# Patient Record
Sex: FEMALE | Race: WHITE | NOT HISPANIC OR LATINO | Employment: OTHER | URBAN - METROPOLITAN AREA
[De-identification: names, ages, dates, MRNs, and addresses within clinical notes are randomized per-mention and may not be internally consistent; named-entity substitution may affect disease eponyms.]

---

## 2023-10-05 DIAGNOSIS — D50.0 IRON DEFICIENCY ANEMIA DUE TO CHRONIC BLOOD LOSS: Primary | ICD-10-CM

## 2023-10-05 DIAGNOSIS — Z13.220 LIPID SCREENING: ICD-10-CM

## 2023-10-09 ENCOUNTER — APPOINTMENT (OUTPATIENT)
Dept: LAB | Facility: CLINIC | Age: 83
End: 2023-10-09
Payer: COMMERCIAL

## 2023-10-09 DIAGNOSIS — Z13.220 LIPID SCREENING: ICD-10-CM

## 2023-10-09 DIAGNOSIS — D50.0 IRON DEFICIENCY ANEMIA DUE TO CHRONIC BLOOD LOSS: ICD-10-CM

## 2023-10-09 LAB
ALBUMIN SERPL BCP-MCNC: 4.1 G/DL (ref 3.5–5)
ALP SERPL-CCNC: 69 U/L (ref 34–104)
ALT SERPL W P-5'-P-CCNC: 13 U/L (ref 7–52)
ANION GAP SERPL CALCULATED.3IONS-SCNC: 6 MMOL/L
AST SERPL W P-5'-P-CCNC: 19 U/L (ref 13–39)
BASOPHILS # BLD AUTO: 0.08 THOUSANDS/ÂΜL (ref 0–0.1)
BASOPHILS NFR BLD AUTO: 1 % (ref 0–1)
BILIRUB SERPL-MCNC: 0.37 MG/DL (ref 0.2–1)
BUN SERPL-MCNC: 21 MG/DL (ref 5–25)
CALCIUM SERPL-MCNC: 9.7 MG/DL (ref 8.4–10.2)
CHLORIDE SERPL-SCNC: 102 MMOL/L (ref 96–108)
CHOLEST SERPL-MCNC: 193 MG/DL
CO2 SERPL-SCNC: 29 MMOL/L (ref 21–32)
CREAT SERPL-MCNC: 0.91 MG/DL (ref 0.6–1.3)
EOSINOPHIL # BLD AUTO: 0.08 THOUSAND/ÂΜL (ref 0–0.61)
EOSINOPHIL NFR BLD AUTO: 1 % (ref 0–6)
ERYTHROCYTE [DISTWIDTH] IN BLOOD BY AUTOMATED COUNT: 15.2 % (ref 11.6–15.1)
FERRITIN SERPL-MCNC: 5 NG/ML (ref 11–307)
GFR SERPL CREATININE-BSD FRML MDRD: 59 ML/MIN/1.73SQ M
GLUCOSE SERPL-MCNC: 101 MG/DL (ref 65–140)
HCT VFR BLD AUTO: 43.2 % (ref 34.8–46.1)
HDLC SERPL-MCNC: 67 MG/DL
HGB BLD-MCNC: 12.8 G/DL (ref 11.5–15.4)
IMM GRANULOCYTES # BLD AUTO: 0.01 THOUSAND/UL (ref 0–0.2)
IMM GRANULOCYTES NFR BLD AUTO: 0 % (ref 0–2)
IRON SATN MFR SERPL: 8 % (ref 15–50)
IRON SERPL-MCNC: 32 UG/DL (ref 50–212)
LDLC SERPL CALC-MCNC: 107 MG/DL (ref 0–100)
LYMPHOCYTES # BLD AUTO: 1.41 THOUSANDS/ÂΜL (ref 0.6–4.47)
LYMPHOCYTES NFR BLD AUTO: 25 % (ref 14–44)
MCH RBC QN AUTO: 24.5 PG (ref 26.8–34.3)
MCHC RBC AUTO-ENTMCNC: 29.6 G/DL (ref 31.4–37.4)
MCV RBC AUTO: 83 FL (ref 82–98)
MONOCYTES # BLD AUTO: 0.63 THOUSAND/ÂΜL (ref 0.17–1.22)
MONOCYTES NFR BLD AUTO: 11 % (ref 4–12)
NEUTROPHILS # BLD AUTO: 3.38 THOUSANDS/ÂΜL (ref 1.85–7.62)
NEUTS SEG NFR BLD AUTO: 62 % (ref 43–75)
NONHDLC SERPL-MCNC: 126 MG/DL
NRBC BLD AUTO-RTO: 0 /100 WBCS
PLATELET # BLD AUTO: 224 THOUSANDS/UL (ref 149–390)
PMV BLD AUTO: 10.4 FL (ref 8.9–12.7)
POTASSIUM SERPL-SCNC: 4.6 MMOL/L (ref 3.5–5.3)
PROT SERPL-MCNC: 6.6 G/DL (ref 6.4–8.4)
RBC # BLD AUTO: 5.23 MILLION/UL (ref 3.81–5.12)
SODIUM SERPL-SCNC: 137 MMOL/L (ref 135–147)
TIBC SERPL-MCNC: 418 UG/DL (ref 250–450)
TRIGL SERPL-MCNC: 95 MG/DL
TSH SERPL DL<=0.05 MIU/L-ACNC: 3.26 UIU/ML (ref 0.45–4.5)
UIBC SERPL-MCNC: 386 UG/DL (ref 155–355)
WBC # BLD AUTO: 5.59 THOUSAND/UL (ref 4.31–10.16)

## 2023-10-09 PROCEDURE — 82728 ASSAY OF FERRITIN: CPT

## 2023-10-09 PROCEDURE — 83540 ASSAY OF IRON: CPT

## 2023-10-09 PROCEDURE — 85025 COMPLETE CBC W/AUTO DIFF WBC: CPT

## 2023-10-09 PROCEDURE — 36415 COLL VENOUS BLD VENIPUNCTURE: CPT

## 2023-10-09 PROCEDURE — 80061 LIPID PANEL: CPT

## 2023-10-09 PROCEDURE — 84443 ASSAY THYROID STIM HORMONE: CPT

## 2023-10-09 PROCEDURE — 80053 COMPREHEN METABOLIC PANEL: CPT

## 2023-10-09 PROCEDURE — 83550 IRON BINDING TEST: CPT

## 2023-10-17 DIAGNOSIS — N30.00 ACUTE CYSTITIS WITHOUT HEMATURIA: Primary | ICD-10-CM

## 2023-10-17 RX ORDER — NITROFURANTOIN 25; 75 MG/1; MG/1
100 CAPSULE ORAL 2 TIMES DAILY
Qty: 10 CAPSULE | Refills: 0 | Status: SHIPPED | OUTPATIENT
Start: 2023-10-17 | End: 2023-10-22

## 2023-11-15 ENCOUNTER — OFFICE VISIT (OUTPATIENT)
Dept: FAMILY MEDICINE CLINIC | Facility: CLINIC | Age: 83
End: 2023-11-15
Payer: COMMERCIAL

## 2023-11-15 ENCOUNTER — TELEPHONE (OUTPATIENT)
Dept: ADMINISTRATIVE | Facility: OTHER | Age: 83
End: 2023-11-15

## 2023-11-15 VITALS
HEART RATE: 82 BPM | DIASTOLIC BLOOD PRESSURE: 80 MMHG | SYSTOLIC BLOOD PRESSURE: 142 MMHG | OXYGEN SATURATION: 98 % | HEIGHT: 64 IN

## 2023-11-15 DIAGNOSIS — Z00.00 MEDICARE ANNUAL WELLNESS VISIT, SUBSEQUENT: Primary | ICD-10-CM

## 2023-11-15 DIAGNOSIS — R06.09 DOE (DYSPNEA ON EXERTION): ICD-10-CM

## 2023-11-15 DIAGNOSIS — R31.0 GROSS HEMATURIA: ICD-10-CM

## 2023-11-15 PROCEDURE — G0439 PPPS, SUBSEQ VISIT: HCPCS | Performed by: FAMILY MEDICINE

## 2023-11-15 RX ORDER — NITROFURANTOIN 25; 75 MG/1; MG/1
100 CAPSULE ORAL 2 TIMES DAILY
Qty: 10 CAPSULE | Refills: 0 | Status: SHIPPED | OUTPATIENT
Start: 2023-11-15 | End: 2023-11-20

## 2023-11-15 RX ORDER — FUROSEMIDE 20 MG/1
20 TABLET ORAL 2 TIMES DAILY
COMMUNITY
End: 2023-11-15

## 2023-11-15 RX ORDER — HYDROCHLOROTHIAZIDE 25 MG/1
25 TABLET ORAL DAILY
Qty: 30 TABLET | Refills: 3 | Status: SHIPPED | OUTPATIENT
Start: 2023-11-15

## 2023-11-15 RX ORDER — UBIDECARENONE 50 MG
200 CAPSULE ORAL DAILY
COMMUNITY

## 2023-11-15 RX ORDER — TURMERIC 100 %
500 POWDER (GRAM) MISCELLANEOUS 2 TIMES DAILY
COMMUNITY

## 2023-11-15 NOTE — TELEPHONE ENCOUNTER
----- Message from Alexis Watkins sent at 11/15/2023  7:14 AM EST -----  Regarding: Care Gap request  11/15/23 7:14 AM    Hello, our patient attached above has had CRC: Colonoscopy completed/performed. Please assist in updating the patient chart by pulling the Care Everywhere (CE) document. The date of service is 4/2022.      Thank you,  Kathy DAMON [facility]

## 2023-11-15 NOTE — PROGRESS NOTES
Assessment/Plan:       1. Medicare annual wellness visit, subsequent  Assessment & Plan:  Reviewed age-appropriate health maintenance and preventive care. 2. Gross hematuria  Comments:  check u/s  can't have a CT scan  Orders:  -     US kidney and bladder; Future; Expected date: 11/15/2023  -     nitrofurantoin (MACROBID) 100 mg capsule; Take 1 capsule (100 mg total) by mouth 2 (two) times a day for 5 days  -     UA w Reflex to Microscopic w Reflex to Culture -Lab Collect; Future; Expected date: 11/15/2023    3. MACKEY (dyspnea on exertion)  Comments:  check bnp  start hctz  Orders:  -     B-Type Natriuretic Peptide(BNP); Future  -     hydrochlorothiazide (HYDRODIURIL) 25 mg tablet; Take 1 tablet (25 mg total) by mouth daily  -     Echo complete w/ contrast if indicated; Future; Expected date: 11/15/2023          Subjective:      Patient ID: Warren Malagon is a 82 y.o. female. Patient is here for an annual Medicare she is stable but having a few issues. She has chronic iron deficiency anemia with blood loss which is well-known she recently has had some urinary tract infection symptoms of blood in her urine    Check a urinalysis and an ultrasound of kidney and bladder. She cannot tolerate contrast dye. She also has some dyspnea on exertion possibly related to weight or deconditioning but we will check an echo and a BNP. She is not a smoker. She drinks rarely she understands importance of healthy diet and exercise. The following portions of the patient's history were reviewed and updated as appropriate: allergies, current medications, past family history, past medical history, past social history, past surgical history, and problem list.    Review of Systems   Respiratory: Negative. Cardiovascular: Negative. Gastrointestinal: Negative. Depression Screening and Follow-up Plan: Patient was screened for depression during today's encounter. They screened negative with a PHQ-2 score of 1. Objective:      /80 (BP Location: Left arm, Patient Position: Sitting, Cuff Size: Standard)   Pulse 82   Ht 5' 4" (1.626 m)   SpO2 98%          Physical Exam  Vitals and nursing note reviewed. Constitutional:       Appearance: Normal appearance. HENT:      Head: Normocephalic and atraumatic. Nose: Nose normal.      Mouth/Throat:      Mouth: Mucous membranes are moist.   Eyes:      Extraocular Movements: Extraocular movements intact. Pupils: Pupils are equal, round, and reactive to light. Cardiovascular:      Rate and Rhythm: Normal rate and regular rhythm. Pulses: Normal pulses. Pulmonary:      Effort: Pulmonary effort is normal.      Breath sounds: Normal breath sounds. Abdominal:      General: Bowel sounds are normal.      Palpations: Abdomen is soft. Musculoskeletal:      Cervical back: Normal range of motion. Skin:     General: Skin is warm and dry. Capillary Refill: Capillary refill takes less than 2 seconds. Neurological:      General: No focal deficit present. Mental Status: She is alert.    Psychiatric:         Mood and Affect: Mood normal.

## 2023-11-15 NOTE — TELEPHONE ENCOUNTER
----- Message from Alexis Dunaway sent at 11/15/2023  7:13 AM EST -----  Regarding: Care Gap request  11/15/23 7:14 AM    Hello, our patient attached above has had Medicare AWV completed/performed. Please assist in updating the patient chart by pulling the Care Everywhere (CE) document. The date of service is [id number]  .      Thank you,  Taras DAMON [facility]

## 2023-11-16 NOTE — TELEPHONE ENCOUNTER
Medicare AWV: Upon review of the In Basket request we have found/obtained the documentation. After careful review of the document we are unable to complete this request for Medicare AWV because the documentation does not have the proper verbiage (wording) needed to close the requested care gap(s). Colonoscopy: Upon review of the In Basket request we have found that the patient has aged out of the measure and/or the document date is outside of the measure timeframe. Any additional questions or concerns should be emailed to the Practice Liaisons via the appropriate education email address, please do not reply via In Basket.     Thank you  Radha Russ

## 2023-11-30 ENCOUNTER — LAB (OUTPATIENT)
Dept: LAB | Facility: CLINIC | Age: 83
End: 2023-11-30
Payer: COMMERCIAL

## 2023-11-30 DIAGNOSIS — R06.09 DOE (DYSPNEA ON EXERTION): ICD-10-CM

## 2023-11-30 DIAGNOSIS — R31.0 GROSS HEMATURIA: ICD-10-CM

## 2023-11-30 LAB — BNP SERPL-MCNC: 108 PG/ML (ref 0–100)

## 2023-11-30 PROCEDURE — 36415 COLL VENOUS BLD VENIPUNCTURE: CPT

## 2023-11-30 PROCEDURE — 83880 ASSAY OF NATRIURETIC PEPTIDE: CPT

## 2023-12-01 ENCOUNTER — APPOINTMENT (OUTPATIENT)
Dept: LAB | Facility: CLINIC | Age: 83
End: 2023-12-01
Payer: COMMERCIAL

## 2023-12-01 LAB
BILIRUB UR QL STRIP: NEGATIVE
CLARITY UR: CLEAR
COLOR UR: NORMAL
GLUCOSE UR STRIP-MCNC: NEGATIVE MG/DL
HGB UR QL STRIP.AUTO: NEGATIVE
KETONES UR STRIP-MCNC: NEGATIVE MG/DL
LEUKOCYTE ESTERASE UR QL STRIP: NEGATIVE
NITRITE UR QL STRIP: NEGATIVE
PH UR STRIP.AUTO: 6.5 [PH]
PROT UR STRIP-MCNC: NEGATIVE MG/DL
SP GR UR STRIP.AUTO: 1.01 (ref 1–1.03)
UROBILINOGEN UR STRIP-ACNC: <2 MG/DL

## 2023-12-01 PROCEDURE — 81003 URINALYSIS AUTO W/O SCOPE: CPT

## 2023-12-19 ENCOUNTER — HOSPITAL ENCOUNTER (OUTPATIENT)
Dept: ULTRASOUND IMAGING | Facility: HOSPITAL | Age: 83
Discharge: HOME | End: 2023-12-19
Payer: COMMERCIAL

## 2023-12-19 ENCOUNTER — HOSPITAL ENCOUNTER (OUTPATIENT)
Dept: NON INVASIVE DIAGNOSTICS | Facility: HOSPITAL | Age: 83
Discharge: HOME | End: 2023-12-19
Payer: COMMERCIAL

## 2023-12-19 VITALS — HEIGHT: 64 IN | SYSTOLIC BLOOD PRESSURE: 142 MMHG | DIASTOLIC BLOOD PRESSURE: 80 MMHG | HEART RATE: 82 BPM

## 2023-12-19 DIAGNOSIS — R31.0 GROSS HEMATURIA: ICD-10-CM

## 2023-12-19 DIAGNOSIS — R06.09 DOE (DYSPNEA ON EXERTION): ICD-10-CM

## 2023-12-19 LAB
AORTIC ROOT: 3.1 CM
AORTIC VALVE MEAN VELOCITY: 11.1 M/S
APICAL FOUR CHAMBER EJECTION FRACTION: 58 %
ASCENDING AORTA: 3.6 CM
AV LVOT MEAN GRADIENT: 4 MMHG
AV LVOT PEAK GRADIENT: 9 MMHG
AV MEAN GRADIENT: 6 MMHG
AV PEAK GRADIENT: 13 MMHG
AV VELOCITY RATIO: 0.81
DOP CALC AO PEAK VEL: 1.8 M/S
DOP CALC AO VTI: 36.75 CM
DOP CALC LVOT PEAK VEL VTI: 33.08 CM
DOP CALC LVOT PEAK VEL: 1.46 M/S
E WAVE DECELERATION TIME: 222 MS
E/A RATIO: 0.86
FRACTIONAL SHORTENING: 46 (ref 28–44)
INTERVENTRICULAR SEPTUM IN DIASTOLE (PARASTERNAL SHORT AXIS VIEW): 0.8 CM
INTERVENTRICULAR SEPTUM: 0.8 CM (ref 0.6–1.1)
LAAS-AP2: 22.6 CM2
LAAS-AP4: 26.9 CM2
LEFT ATRIUM SIZE: 3.5 CM
LEFT ATRIUM VOLUME (MOD BIPLANE): 87 ML
LEFT INTERNAL DIMENSION IN SYSTOLE: 2.7 CM (ref 2.1–4)
LEFT VENTRICULAR INTERNAL DIMENSION IN DIASTOLE: 5 CM (ref 3.5–6)
LEFT VENTRICULAR POSTERIOR WALL IN END DIASTOLE: 0.8 CM
LEFT VENTRICULAR STROKE VOLUME: 93 ML
LVSV (TEICH): 93 ML
MV E'TISSUE VEL-SEP: 9 CM/S
MV PEAK A VEL: 1.1 M/S
MV PEAK E VEL: 95 CM/S
MV STENOSIS PRESSURE HALF TIME: 64 MS
MV VALVE AREA P 1/2 METHOD: 3.44
RIGHT VENTRICLE ID DIMENSION: 4.2 CM
SL CV LEFT ATRIUM LENGTH A2C: 5.5 CM
SL CV LV EF: 55
SL CV PED ECHO LEFT VENTRICLE DIASTOLIC VOLUME (MOD BIPLANE) 2D: 119 ML
SL CV PED ECHO LEFT VENTRICLE SYSTOLIC VOLUME (MOD BIPLANE) 2D: 27 ML
TR MAX PG: 37 MMHG
TR PEAK VELOCITY: 3.1 M/S
TRICUSPID ANNULAR PLANE SYSTOLIC EXCURSION: 2.5 CM
TRICUSPID VALVE PEAK REGURGITATION VELOCITY: 3.05 M/S

## 2023-12-19 PROCEDURE — 76775 US EXAM ABDO BACK WALL LIM: CPT

## 2023-12-19 PROCEDURE — 93306 TTE W/DOPPLER COMPLETE: CPT | Performed by: INTERNAL MEDICINE

## 2023-12-19 PROCEDURE — 93306 TTE W/DOPPLER COMPLETE: CPT

## 2024-01-14 PROBLEM — Z00.00 MEDICARE ANNUAL WELLNESS VISIT, SUBSEQUENT: Status: RESOLVED | Noted: 2023-11-15 | Resolved: 2024-01-14

## 2024-04-04 DIAGNOSIS — K04.7 TOOTH ABSCESS: Primary | ICD-10-CM

## 2024-04-04 RX ORDER — PENICILLIN V POTASSIUM 500 MG/1
500 TABLET ORAL EVERY 8 HOURS SCHEDULED
Qty: 30 TABLET | Refills: 0 | Status: SHIPPED | OUTPATIENT
Start: 2024-04-04 | End: 2024-04-14

## 2024-04-12 DIAGNOSIS — R06.09 DOE (DYSPNEA ON EXERTION): ICD-10-CM

## 2024-04-12 DIAGNOSIS — R29.898 LEG WEAKNESS, BILATERAL: Primary | ICD-10-CM

## 2024-04-12 RX ORDER — HYDROCHLOROTHIAZIDE 25 MG/1
25 TABLET ORAL DAILY
Qty: 30 TABLET | Refills: 3 | Status: SHIPPED | OUTPATIENT
Start: 2024-04-12

## 2024-04-22 ENCOUNTER — DOCUMENTATION (OUTPATIENT)
Dept: FAMILY MEDICINE CLINIC | Facility: CLINIC | Age: 84
End: 2024-04-22

## 2024-04-22 DIAGNOSIS — K04.7 TOOTH ABSCESS: Primary | ICD-10-CM

## 2024-04-22 RX ORDER — PENICILLIN V POTASSIUM 500 MG/1
500 TABLET ORAL EVERY 8 HOURS SCHEDULED
Qty: 30 TABLET | Refills: 0 | Status: SHIPPED | OUTPATIENT
Start: 2024-04-22 | End: 2024-05-02

## 2024-04-29 DIAGNOSIS — D50.9 IRON DEFICIENCY ANEMIA, UNSPECIFIED IRON DEFICIENCY ANEMIA TYPE: Primary | ICD-10-CM

## 2024-04-30 ENCOUNTER — APPOINTMENT (OUTPATIENT)
Dept: LAB | Facility: CLINIC | Age: 84
End: 2024-04-30
Payer: COMMERCIAL

## 2024-04-30 DIAGNOSIS — D50.9 IRON DEFICIENCY ANEMIA, UNSPECIFIED IRON DEFICIENCY ANEMIA TYPE: ICD-10-CM

## 2024-04-30 LAB
CRP SERPL QL: 2.3 MG/L
ERYTHROCYTE [DISTWIDTH] IN BLOOD BY AUTOMATED COUNT: 16.9 % (ref 11.6–15.1)
EST. AVERAGE GLUCOSE BLD GHB EST-MCNC: 120 MG/DL
FERRITIN SERPL-MCNC: 11 NG/ML (ref 11–307)
HBA1C MFR BLD: 5.8 %
HCT VFR BLD AUTO: 45.7 % (ref 34.8–46.1)
HGB BLD-MCNC: 14 G/DL (ref 11.5–15.4)
MCH RBC QN AUTO: 26.3 PG (ref 26.8–34.3)
MCHC RBC AUTO-ENTMCNC: 30.6 G/DL (ref 31.4–37.4)
MCV RBC AUTO: 86 FL (ref 82–98)
PLATELET # BLD AUTO: 213 THOUSANDS/UL (ref 149–390)
PMV BLD AUTO: 9.8 FL (ref 8.9–12.7)
RBC # BLD AUTO: 5.32 MILLION/UL (ref 3.81–5.12)
WBC # BLD AUTO: 6.35 THOUSAND/UL (ref 4.31–10.16)

## 2024-04-30 PROCEDURE — 83036 HEMOGLOBIN GLYCOSYLATED A1C: CPT

## 2024-04-30 PROCEDURE — 85027 COMPLETE CBC AUTOMATED: CPT

## 2024-04-30 PROCEDURE — 82728 ASSAY OF FERRITIN: CPT

## 2024-04-30 PROCEDURE — 80053 COMPREHEN METABOLIC PANEL: CPT

## 2024-04-30 PROCEDURE — 86140 C-REACTIVE PROTEIN: CPT

## 2024-04-30 PROCEDURE — 36415 COLL VENOUS BLD VENIPUNCTURE: CPT

## 2024-05-08 LAB
ALBUMIN SERPL BCP-MCNC: 4.2 G/DL (ref 3.5–5)
ALP SERPL-CCNC: 56 U/L (ref 34–104)
ALT SERPL W P-5'-P-CCNC: 16 U/L (ref 7–52)
ANION GAP SERPL CALCULATED.3IONS-SCNC: 7 MMOL/L (ref 4–13)
AST SERPL W P-5'-P-CCNC: 22 U/L (ref 13–39)
BILIRUB SERPL-MCNC: 0.43 MG/DL (ref 0.2–1)
BUN SERPL-MCNC: 18 MG/DL (ref 5–25)
CALCIUM SERPL-MCNC: 9.6 MG/DL (ref 8.4–10.2)
CHLORIDE SERPL-SCNC: 101 MMOL/L (ref 96–108)
CO2 SERPL-SCNC: 28 MMOL/L (ref 21–32)
CREAT SERPL-MCNC: 0.85 MG/DL (ref 0.6–1.3)
GFR SERPL CREATININE-BSD FRML MDRD: 64 ML/MIN/1.73SQ M
GLUCOSE P FAST SERPL-MCNC: 104 MG/DL (ref 65–99)
POTASSIUM SERPL-SCNC: 4.3 MMOL/L (ref 3.5–5.3)
PROT SERPL-MCNC: 6.5 G/DL (ref 6.4–8.4)
SODIUM SERPL-SCNC: 136 MMOL/L (ref 135–147)

## 2024-05-14 NOTE — PROGRESS NOTES
PT Evaluation     Today's date: 5/15/2024  Patient name: Hazel Rodney  : 1940  MRN: [id number]  Referring provider: Budinetz, Robert, MD  Dx:   Encounter Diagnosis     ICD-10-CM    1. Leg weakness, bilateral  R29.898 Ambulatory Referral to Physical Therapy          Start Time: 1430  Stop Time: 1515  Total time in clinic (min): 45 minutes    Assessment  Impairments: abnormal or restricted ROM, activity intolerance, impaired physical strength and lacks appropriate home exercise program    Assessment details: Hazel Rodney is a 83 y.o. female presenting to outpatient physical therapy with chief complaint of B leg weakness and B knee pain. Pt reporting difficulty walking, standing, and stair climbing. Per examination, noted impairments including B knee pain, impaired BLE soft tissue extensibility, significantly reduced B knee range of motion, reduced BLE strength, reduced postural awareness, impaired gait and reduced activity tolerance. Pt demo significantly impaired B knee ext>flx. Pt demonstrating impaired knee ext in stance as well. Ambulating with shortened stride length and wide MARYSOL. RLE tending to ER. Demo difficulty performing STS and functional squat. No change in symptoms with lumbar AROM. RLE strength slightly more impaired than LLE. Also demo impaired static/dynamic balance. Due to noted impairments, the patient's present functional limitations include difficulty with ADLs/IADLs, difficulty walking, stair climbing, and standing, reliance on medication and/or modalities for pain relief, reduced tolerance for functional mobility/activity, and difficulty completing HH responsibilities. Patient to benefit from skilled outpatient physical therapy 2x/week for 6-8 weeks in order to reduce pain, maximize pain free LE range of motion, increase LE strength and stability, improve balance, and improve functional mobility/functional activity in order to maximize return to prior level of function with reduced  "limitations. Home exercise program was provided and all questions answered to patient's level of satisfaction. Thank you for your referral.       Understanding of Dx/Px/POC: good     Prognosis: fair    Goals  STGs to be achieved in 4 weeks:  1. Pt to demonstrate reduced subjective pain rating \"at worst\" by at least 2-3 points from Initial Eval in order to allow for reduced pain with ADLs and improved functional activity tolerance.   2. Pt to demonstrate increased AROM of B knee flexion by at least 5-10 degrees in order to allow for greater ease and independence with ADLs and functional mobility.   3. Pt to demonstrate increased AROM of B knee extension by at least 5-10 degrees in order to maximize joint mobility and function and allow for progression of exercise program and achievement of goals.   4. Pt to demonstrate increased MMT of impaired B hip and knee movements by at least 1/2-1 grade in order to improve safety and stability with ADLs and functional mobility.   5. Pt to report at least 25% improvement with performance of ADLs/IADLs to demo improving function.     LTGs to be achieved by discharge:  1. Pt will be I with HEP in order to continue to improve quality of life and independence and reduce risk for re-injury.   2. Pt to demonstrate   3. Pt to demonstrate improved function as noted by achieving or exceeding predicted score on FOTO outcomes assessment tool.   4. Pt will demonstrate 4+-5/5 MMT of B knee strength to demonstrate improved overall LE function.     5. Pt to demonstrate ability to walk for at least 20 minutes without rest break           Plan  Patient would benefit from: skilled physical therapy  Planned modality interventions: cryotherapy and thermotherapy: hydrocollator packs    Planned therapy interventions: abdominal trunk stabilization, IASTM, joint mobilization, manual therapy, massage, neuromuscular re-education, patient education, strengthening, stretching, therapeutic activities, " therapeutic exercise, home exercise program, flexibility, coordination, body mechanics training and balance    Frequency: 2x week  Duration in weeks: 8  Plan of Care beginning date: 5/15/2024  Plan of Care expiration date: 2024  Treatment plan discussed with: patient      Subjective Evaluation    History of Present Illness  Mechanism of injury: Pt is presenting to OPPT with chief complaint of  B leg weakness. Pt reports she feels she can only stand and walk for a few minutes due to weakness and pain. Reports that she used to walk miles and she can not do that anymore. Reports that weakness started a few years ago; at least 10 years. Reports that weakness has been progressing. Reports that she has B knee pain as well and feels her ankles feel unstable. Reports that her gait is off and so that causes some pain as well. Reports she uses walking poles to steady herself. Reports that she also feels her endurance is down as well. Reports that she is also SOB when walking/standing. Reports that she has 17 steps to get into bathroom and reports that she has significant difficulty ascending/descending. Denies any numbness/tingling into BLE. Reports that she has arthritis in both knees. Reports her balance is also off and she feels unsteady. Reports that her R foot also pronates.     Reports she is currently taking diuretic. Reports 2 previous knee arthroscopies.           Recurrent probem    Quality of life: good    Patient Goals  Patient goals for therapy: decreased pain, improved balance, increased motion, increased strength, independence with ADLs/IADLs and return to sport/leisure activities  Patient goal: increase strength of my legs, get back to walking a couple hundred yards  Pain  Current pain ratin  At best pain ratin  At worst pain ratin  Location: B knees, medial aspect of R knee, entire aspect of L knee  Quality: sharp and dull ache  Relieving factors: rest, relaxation and medications  Aggravating  factors: walking, standing and lifting    Social Support  Stairs in house: yes   Lives in: multiple-level home    Employment status: not working    Diagnostic Tests  No diagnostic tests performed  Treatments  Previous treatment: physical therapy  Current treatment: physical therapy      Objective     Concurrent Complaints  Positive for bladder dysfunction. Negative for night pain, disturbed sleep, bowel dysfunction and saddle (S4) numbness    Neurological Testing     Sensation     Lumbar   Left   Intact: light touch    Right   Intact: light touch    Active Range of Motion     Lumbar   Flexion:  Restriction level: moderate  Extension:  Restriction level: moderate  Left lateral flexion:  Restriction level: moderate  Right lateral flexion:  Restriction level: moderate  Left rotation:  Restriction level: moderate  Right rotation:  Restriction level: moderate  Left Knee   Flexion: 120 degrees with pain  Extension: -20 degrees with pain    Right Knee   Flexion: 108 degrees with pain  Extension: -22 degrees with pain    Strength/Myotome Testing     Left Hip   Planes of Motion   Flexion: 4  Extension: 4  Abduction: 4  Adduction: 4+  External rotation: 4  Internal rotation: 4    Isolated Muscles   Gluteus charito: 4-  Gluteus medius: 4-    Right Hip   Planes of Motion   Flexion: 4-  Extension: 4-  Abduction: 4-  Adduction: 4+  External rotation: 4  Internal rotation: 4    Isolated Muscles   Gluteus maximums: 4-  Gluteus medius: 4-    Left Knee   Flexion: 4-  Extension: 4-    Right Knee   Flexion: 4  Extension: 4    Left Ankle/Foot   Dorsiflexion: 4+  Plantar flexion: 4+  Inversion: 4+  Eversion: 4+    Right Ankle/Foot   Dorsiflexion: 4  Plantar flexion: 4+  Inversion: 4+  Eversion: 4+    Tests     Lumbar     Left   Negative crossed SLR and passive SLR.     Right   Negative crossed SLR and passive SLR.     Ambulation     Observational Gait   Gait: antalgic   Decreased walking speed and stride length.            Precautions:  hx of GI bleed, low iron       Re-eval Date:  by 6/14/24    Date 5/15/2024        Visit Count 1       FOTO 5/15/2024        Pain In See IE       Pain Out See IE           Manuals 5/15/2024                                        Neuro Re-Ed        Tandem stance         Tandem walking         FTEO on foam         Hurdles                                 Ther Ex        Nustep        Seated marches  reviewed       LAQ reviewed       Seated hip abd        Seated hip add        SLR reviewed       Leg ext/flx machine        Leg press         squats        Step ups        Side steps         Hamstring stretch         Calf stretch         Heel slides                 Ther Activity        STS                Gait Training                        Modalities                              5/15/2024  - HEP was issued and reviewed this date for above noted exercises. Pt demonstrated understanding without incident and without questions/concerns. Will continue to update upcoming.

## 2024-05-15 ENCOUNTER — EVALUATION (OUTPATIENT)
Dept: PHYSICAL THERAPY | Facility: CLINIC | Age: 84
End: 2024-05-15
Payer: COMMERCIAL

## 2024-05-15 DIAGNOSIS — R29.898 LEG WEAKNESS, BILATERAL: ICD-10-CM

## 2024-05-15 PROCEDURE — 97110 THERAPEUTIC EXERCISES: CPT

## 2024-05-15 PROCEDURE — 97161 PT EVAL LOW COMPLEX 20 MIN: CPT

## 2024-05-20 ENCOUNTER — OFFICE VISIT (OUTPATIENT)
Dept: PHYSICAL THERAPY | Facility: CLINIC | Age: 84
End: 2024-05-20
Payer: COMMERCIAL

## 2024-05-20 DIAGNOSIS — R29.898 LEG WEAKNESS, BILATERAL: Primary | ICD-10-CM

## 2024-05-20 PROCEDURE — 97110 THERAPEUTIC EXERCISES: CPT

## 2024-05-20 NOTE — PROGRESS NOTES
"Daily Note     Today's date: 2024  Patient name: Hazel Rodney  : 1940  MRN: [id number]  Referring provider: Budinetz, Robert, MD  Dx:   Encounter Diagnosis     ICD-10-CM    1. Leg weakness, bilateral  R29.898           Start Time: 1600  Stop Time: 1645  Total time in clinic (min): 45 minutes    Subjective: \"My knees hurt.  I didn't do any exercises over the weekend. \"      Objective: See treatment diary below      Assessment: Tolerated treatment fair. Initiated exercise program this date.  Decreased endurance and standing tolerance.  Alternated between seated vs standing with good tolerance.  Decreased pain in knees noted at end of session.  Will continue to monitor and progress as able.  Patient demonstrated fatigue post treatment and would benefit from continued PT      Plan: Continue per plan of care.  Progress treatment as tolerated.       Precautions: hx of GI bleed, low iron       Re-eval Date:  by 24    Date 5/15/2024  5/20      Visit Count 1 2      FOTO 5/15/2024        Pain In See IE 7/10      Pain Out See IE 5-610          Manuals 5/15/2024  5/20                                      Neuro Re-Ed        Tandem stance         Tandem walking         FTEO on foam         Hurdles                                 Ther Ex        Nustep  L1 10'      Seated marches  reviewed       LAQ reviewed 2x10/3-5\"      Seated hip abd  Grn   2x10/3-5\"      Seated hip add  Ball 20x/3-5\"      SLR reviewed Standing flex/abd  2x10/3-5\"      Leg ext/flx machine        Leg press         squats        Step ups        Side steps         Hamstring stretch         Calf stretch         Heel slides                 Ther Activity        STS                Gait Training                        Modalities                              5/15/2024  - HEP was issued and reviewed this date for above noted exercises. Pt demonstrated understanding without incident and without questions/concerns. Will continue to update upcoming.          "

## 2024-05-24 DIAGNOSIS — N30.00 ACUTE CYSTITIS WITHOUT HEMATURIA: Primary | ICD-10-CM

## 2024-05-24 RX ORDER — NITROFURANTOIN 25; 75 MG/1; MG/1
100 CAPSULE ORAL 2 TIMES DAILY
Qty: 10 CAPSULE | Refills: 0 | Status: SHIPPED | OUTPATIENT
Start: 2024-05-24 | End: 2024-05-29

## 2024-05-30 ENCOUNTER — OFFICE VISIT (OUTPATIENT)
Dept: PHYSICAL THERAPY | Facility: CLINIC | Age: 84
End: 2024-05-30
Payer: COMMERCIAL

## 2024-05-30 DIAGNOSIS — R29.898 LEG WEAKNESS, BILATERAL: Primary | ICD-10-CM

## 2024-05-30 PROCEDURE — 97110 THERAPEUTIC EXERCISES: CPT

## 2024-05-30 NOTE — PROGRESS NOTES
"Daily Note     Today's date: 2024  Patient name: Hazel Rodney  : 1940  MRN: [id number]  Referring provider: Budinetz, Robert, MD  Dx:   Encounter Diagnosis     ICD-10-CM    1. Leg weakness, bilateral  R29.898           Start Time: 1115  Stop Time: 1200  Total time in clinic (min): 45 minutes    Subjective: \"My legs are feeling weak today and my R ankle is bothering me\"       Objective: See treatment diary below      Assessment: Tolerated treatment well. Able to complete POC without incident. Tolerated progression of LE strengthening machines well today with appropriate fatigue noted. Cont with impaired B knee extension when ambulating and tends to walk in crouched position. No increase in pain/sx at end of session. Overall endurance remains impaired as well; pt fatigued from ambulating into clinic. Will cont to progress as tolerated. Patient demonstrated fatigue post treatment and would benefit from continued PT      Plan: Continue per plan of care.  Progress treatment as tolerated.       Precautions: hx of GI bleed, low iron       Re-eval Date:  by 24    Date 5/15/2024  5/20 5/30     Visit Count 1 2 3     FOTO 5/15/2024        Pain In See IE 7/10 6/10     Pain Out See IE -6/10 5/10          Manuals 5/15/2024  5/20 5/30                                     Neuro Re-Ed        Tandem stance         Tandem walking         FTEO on foam         Hurdles                                 Ther Ex        Nustep  L1 10' L2 10'      Seated marches  reviewed       LAQ reviewed 2x10/3-5\" 2x10/3-5\"      Seated hip abd  Grn   2x10/3-5\" Grn   2x10/3-5\"     Seated hip add  Ball 20x/3-5\" Ball 30x/3-5\"     SLR reviewed Standing flex/abd  2x10/3-5\" Standing flex/abd  2x10/3-5\"     Leg ext/flx machine   Flx 11# 2x10     Ext 11# 2x10     Leg press    50# 2x10      squats        Step ups        Side steps         Hamstring stretch         Calf stretch         Heel slides                 Ther Activity        STS              "   Gait Training                        Modalities                              5/15/2024  - HEP was issued and reviewed this date for above noted exercises. Pt demonstrated understanding without incident and without questions/concerns. Will continue to update upcoming.

## 2024-05-31 DIAGNOSIS — I10 BENIGN ESSENTIAL HTN: Primary | ICD-10-CM

## 2024-05-31 RX ORDER — LISINOPRIL 10 MG/1
10 TABLET ORAL DAILY
Qty: 90 TABLET | Refills: 3 | Status: SHIPPED | OUTPATIENT
Start: 2024-05-31

## 2024-06-03 ENCOUNTER — OFFICE VISIT (OUTPATIENT)
Dept: PHYSICAL THERAPY | Facility: CLINIC | Age: 84
End: 2024-06-03
Payer: COMMERCIAL

## 2024-06-03 DIAGNOSIS — R29.898 LEG WEAKNESS, BILATERAL: Primary | ICD-10-CM

## 2024-06-03 PROCEDURE — 97110 THERAPEUTIC EXERCISES: CPT

## 2024-06-03 NOTE — PROGRESS NOTES
"Daily Note     Today's date: 6/3/2024  Patient name: Hazel Rodney  : 1940  MRN: [id number]  Referring provider: Budinetz, Robert, MD  Dx:   Encounter Diagnosis     ICD-10-CM    1. Leg weakness, bilateral  R29.898           Start Time: 1115  Stop Time: 1200  Total time in clinic (min): 45 minutes    Subjective: \"My R knee hurts this morning and I think it might be because I had gluten. I felt good after alst session but was a little sore but I felt really good yesterday\"       Objective: See treatment diary below      Assessment: Tolerated treatment well. Able to complete POC without incident. Pt continuing to walk with flexed gait pattern due to decreased knee ext ROM. Noted instability with ambulation throughout clinic. Pt was able to increase reps this session with there ex which pt tolerated well; pt did experience increased LE fatigue. Pt requiring seated rest breaks throughout session due to overall fatigue and decreased endurance. Standing exercises limited due to B knee pain. Will cont to progress as tolerated.  Patient demonstrated fatigue post treatment and would benefit from continued PT      Plan: Continue per plan of care.  Progress treatment as tolerated.       Precautions: hx of GI bleed, low iron       Re-eval Date:  by 24    Date 5/15/2024  5/20 5/30 6/3    Visit Count 1 2 3 4    FOTO 5/15/2024        Pain In See IE 7/10 6/10 5/10 B knee     Pain Out See IE -6/10 5/10  5/10         Manuals 5/15/2024  5/20 5/30 6/3                                    Neuro Re-Ed        Tandem stance         Tandem walking         FTEO on foam         Hurdles                                 Ther Ex        Nustep  L1 10' L2 10'  L3 10'     Seated marches  reviewed   x30    LAQ reviewed 2x10/3-5\" 2x10/3-5\"      Seated hip abd  Grn   2x10/3-5\" Grn   2x10/3-5\" blue   2x10/3-5\"    Seated hip add  Ball 20x/3-5\" Ball 30x/3-5\" Ball 30x/3-5\"     SLR reviewed Standing flex/abd  2x10/3-5\" Standing flex/abd  2x10/3-5\" " "Standing flex/abd  2x10/3-5\"    Leg ext/flx machine   Flx 11# 2x10     Ext 11# 2x10 Flx 11# 2x10     Ext 11# 2x10    Leg press    50# 2x10  60# 3x10     squats        Step ups        Side steps         Hamstring stretch         Calf stretch         Heel slides                 Ther Activity        STS                Gait Training                        Modalities                              5/15/2024  - HEP was issued and reviewed this date for above noted exercises. Pt demonstrated understanding without incident and without questions/concerns. Will continue to update upcoming.                "

## 2024-06-11 ENCOUNTER — EVALUATION (OUTPATIENT)
Dept: PHYSICAL THERAPY | Facility: CLINIC | Age: 84
End: 2024-06-11
Payer: COMMERCIAL

## 2024-06-11 DIAGNOSIS — R29.898 LEG WEAKNESS, BILATERAL: Primary | ICD-10-CM

## 2024-06-11 PROCEDURE — 97110 THERAPEUTIC EXERCISES: CPT

## 2024-06-11 NOTE — PROGRESS NOTES
"PT Re-Evaluation     Today's date: 2024  Patient name: Hazel Rodney  : 1940  MRN: [id number]  Referring provider: Budinetz, Robert, MD  Dx:   Encounter Diagnosis     ICD-10-CM    1. Leg weakness, bilateral  R29.898             Start Time: 1115  Stop Time: 1210  Total time in clinic (min): 55 minutes    Assessment  Impairments: abnormal or restricted ROM, activity intolerance, impaired physical strength and lacks appropriate home exercise program    Assessment details: Hazel Rodney has been consistent and compliant with attending PT sessions. Noted improvements in BLE strength and B knee ROM since SOC, though continuing with impairments. Pt still lacking full knee extension which is impacting ambulation. Pt has been able to tolerate increased standing exercises, but continues to be challenged with standing exercises due to overall decreased muscular/CV endurance. Still continuing with some noted difficulty performing STS due to leg weakness, but able to complete with quicker performance and less UE support. Continues with noted BLE soft tissue restrictions, though improving steadily. Pt educated how progressing sessions to 2x/wk may help with improvement in noted impairments since pt has reported inability to perform HEP consistently. Pt in agreement. Also, plan to add in more balance activities now that pt can tolerate standing for longer periods. Due to noted impairments, pt will continue to benefit from skilled PT services 2x/wk for another 4 weeks to address continued impairments that continue to impact overall function, performance of ADLs/IADLs, and mobility. Exam findings were discussed with pt and all questions answered to pt satisfaction.         Understanding of Dx/Px/POC: good     Prognosis: fair    Goals  STGs to be achieved in 4 weeks:  1. Pt to demonstrate reduced subjective pain rating \"at worst\" by at least 2-3 points from Initial Eval in order to allow for reduced pain with ADLs and improved " functional activity tolerance. PROGRESSING  2. Pt to demonstrate increased AROM of B knee flexion by at least 5-10 degrees in order to allow for greater ease and independence with ADLs and functional mobility. PARTIALLY MET  3. Pt to demonstrate increased AROM of B knee extension by at least 5-10 degrees in order to maximize joint mobility and function and allow for progression of exercise program and achievement of goals. PARTIALLY MET  4. Pt to demonstrate increased MMT of impaired B hip and knee movements by at least 1/2-1 grade in order to improve safety and stability with ADLs and functional mobility. MET  5. Pt to report at least 25% improvement with performance of ADLs/IADLs to demo improving function. PROGRESSING    LTGs to be achieved by discharge:  1. Pt will be I with HEP in order to continue to improve quality of life and independence and reduce risk for re-injury. PROGRESSING  2. Pt to demonstrate improved function as noted by achieving or exceeding predicted score on FOTO outcomes assessment tool.   3. Pt will demonstrate 4+-5/5 MMT of B knee strength to demonstrate improved overall LE function.   PROGRESSING  4. Pt to demonstrate ability to walk for at least 20 minutes without rest break PROGRESSING          Plan  Patient would benefit from: skilled physical therapy  Planned modality interventions: cryotherapy and thermotherapy: hydrocollator packs    Planned therapy interventions: abdominal trunk stabilization, IASTM, joint mobilization, manual therapy, massage, neuromuscular re-education, patient education, strengthening, stretching, therapeutic activities, therapeutic exercise, home exercise program, flexibility, coordination, body mechanics training and balance    Frequency: 2x week  Duration in weeks: 4  Plan of Care beginning date: 6/11/2024  Plan of Care expiration date: 7/23/2024  Treatment plan discussed with: patient      Subjective Evaluation    History of Present Illness  Mechanism of  injury: Pt is presenting to OPPT with chief complaint of  B leg weakness. Pt reports she feels she can only stand and walk for a few minutes due to weakness and pain. Reports that she used to walk miles and she can not do that anymore. Reports that weakness started a few years ago; at least 10 years. Reports that weakness has been progressing. Reports that she has B knee pain as well and feels her ankles feel unstable. Reports that her gait is off and so that causes some pain as well. Reports she uses walking poles to steady herself. Reports that she also feels her endurance is down as well. Reports that she is also SOB when walking/standing. Reports that she has 17 steps to get into bathroom and reports that she has significant difficulty ascending/descending. Denies any numbness/tingling into BLE. Reports that she has arthritis in both knees. Reports her balance is also off and she feels unsteady. Reports that her R foot also pronates.     Reports she is currently taking diuretic. Reports 2 previous knee arthroscopies.     6/11/24: Pt reports she feels her strength in BLE is improving steadily. Reports that she feels she can get around slightly easier than before. Reports she is continuing with pain in B knees that does limit performance of activities. Reports she has not walked very far distances yet due to impaired endurance. Reports she still has some difficulty standing up from chair but can now perform quicker. Reports she has been performing HEP as well. Reports R leg still feels a little weaker than the R.           Recurrent probem    Quality of life: good    Patient Goals  Patient goals for therapy: decreased pain, improved balance, increased motion, increased strength, independence with ADLs/IADLs and return to sport/leisure activities  Patient goal: increase strength of my legs PROGRESSING, get back to walking a couple hundred yards PROGRESSING  Pain  Current pain rating: 3  At best pain rating: 3  At  worst pain ratin  Location: B knees, medial aspect of R knee, entire aspect of L knee  Quality: sharp and dull ache  Relieving factors: rest, relaxation and medications  Aggravating factors: walking, standing and lifting    Social Support  Stairs in house: yes   Lives in: multiple-level home    Employment status: not working    Diagnostic Tests  No diagnostic tests performed  Treatments  Previous treatment: physical therapy  Current treatment: physical therapy      Objective     Concurrent Complaints  Positive for bladder dysfunction. Negative for night pain, disturbed sleep, bowel dysfunction and saddle (S4) numbness    Neurological Testing     Sensation     Lumbar   Left   Intact: light touch    Right   Intact: light touch    Active Range of Motion     Lumbar   Flexion:  Restriction level: moderate  Extension:  Restriction level: moderate  Left lateral flexion:  Restriction level: minimal  Right lateral flexion:  Restriction level: minimal  Left rotation:  Restriction level: moderate  Right rotation:  Restriction level: moderate  Left Knee   Flexion: 120 degrees with pain  Extension: -16 degrees with pain    Right Knee   Flexion: 110 degrees with pain  Extension: -19 degrees with pain    Strength/Myotome Testing     Left Hip   Planes of Motion   Flexion: 4  Extension: 4  Abduction: 4  Adduction: 4+  External rotation: 4  Internal rotation: 4+    Isolated Muscles   Gluteus charito: 4  Gluteus medius: 4    Right Hip   Planes of Motion   Flexion: 4  Extension: 4  Abduction: 4  Adduction: 4+  External rotation: 4  Internal rotation: 4+    Isolated Muscles   Gluteus maximums: 4  Gluteus medius: 4    Left Knee   Flexion: 4  Extension: 4+    Right Knee   Flexion: 4  Extension: 4+    Left Ankle/Foot   Dorsiflexion: 4+  Plantar flexion: 4+  Inversion: 4+  Eversion: 4+    Right Ankle/Foot   Dorsiflexion: 4+  Plantar flexion: 4+  Inversion: 4+  Eversion: 4+    Ambulation     Observational Gait   Gait: antalgic  "  Decreased walking speed and stride length.            Precautions: hx of GI bleed, low iron     Re-eval Date:  by 6/14/24     Date 5/15/2024  5/20 5/30 6/3  6/11   Visit Count 1 2 3 4  5   FOTO 5/15/2024            Pain In See IE 7/10 6/10 5/10 B knee   3/10    Pain Out See IE 5-6/10 5/10  5/10   3/10          Manuals 5/15/2024  5/20 5/30 6/3  6/11                                                           Neuro Re-Ed             Tandem stance              Tandem walking              FTEO on foam              Hurdles                                                        Ther Ex             Nustep   L1 10' L2 10'  L3 10'   L5 12'    Seated marches  reviewed     x30  standing marches x30   LAQ reviewed 2x10/3-5\" 2x10/3-5\"        Seated hip abd   Grn   2x10/3-5\" Grn   2x10/3-5\" blue   2x10/3-5\"  blue   2x10/3-5\"   Seated hip add   Ball 20x/3-5\" Ball 30x/3-5\" Ball 30x/3-5\"   Ball 30x/3-5\"    SLR reviewed Standing flex/abd  2x10/3-5\" Standing flex/abd  2x10/3-5\" Standing flex/abd  2x10/3-5\"  Standing flex/abd  2x10/3-5\"   Leg ext/flx machine     Flx 11# 2x10      Ext 11# 2x10 Flx 11# 2x10      Ext 11# 2x10  Flx 22# 2x10      Ext 22# 2x10   Leg press      50# 2x10  60# 3x10   65# 3x10    squats             Step ups             Side steps              Hamstring stretch              Calf stretch           calf stretch with strap 3x30\"    Heel slides                            Ther Activity             STS                           Gait Training                                         Modalities                                                       "

## 2024-06-18 ENCOUNTER — OFFICE VISIT (OUTPATIENT)
Dept: PHYSICAL THERAPY | Facility: CLINIC | Age: 84
End: 2024-06-18
Payer: COMMERCIAL

## 2024-06-18 DIAGNOSIS — R29.898 LEG WEAKNESS, BILATERAL: Primary | ICD-10-CM

## 2024-06-18 PROCEDURE — 97110 THERAPEUTIC EXERCISES: CPT

## 2024-06-18 NOTE — PROGRESS NOTES
"Daily Note     Today's date: 2024  Patient name: Hazel Rodney  : 1940  MRN: [id number]  Referring provider: Budinetz, Robert, MD  Dx:   Encounter Diagnosis     ICD-10-CM    1. Leg weakness, bilateral  R29.898                      Subjective: Pt notes her R knee hurts more than her L, but her R knee is stronger than her Left.  Notes clicking R knee on NS. Pt feels it may be due to excess fluid. Pt states her BP was 154/101 this morning.      Objective: See treatment diary below      Assessment: Tolerated treatment well. Patient demonstrated fatigue post treatment, exhibited good technique with therapeutic exercises, and would benefit from continued PT  Ambulating with knees flexed. Pt completed all ex, req'd seated rest after standing ex. Pt finds standing ex challenging.    Plan: Continue per plan of care.      Precautions: hx of GI bleed, low iron     Re-eval Date:  by 24     Date 6/18 5/20 5/30 6/3  6/11   Visit Count 6 2 3 4  5   FOTO 5/15/2024            Pain In 5/10 7/10 6/10 5/10 B knee   3/10    Pain Out  5-6/10 5/10  5/10   3/10          Manuals 6/18 5/20 5/30 6/3  6/11                                                           Neuro Re-Ed             Tandem stance              Tandem walking              FTEO on foam              Hurdles                                                        Ther Ex             Nustep  L5  10' L1 10' L2 10'  L3 10'   L5 12'    Seated marches   Standing 30x     x30  standing marches x30   LAQ 50x  B 2x10/3-5\" 2x10/3-5\"        Seated hip abd  blue 30x Grn   2x10/3-5\" Grn   2x10/3-5\" blue   2x10/3-5\"  blue   2x10/3-5\"   Seated hip add  ball  30x Ball 20x/3-5\" Ball 30x/3-5\" Ball 30x/3-5\"   Ball 30x/3-5\"    SLR    Standing flex/abd  3x10/3-5 Standing flex/abd  2x10/3-5\" Standing flex/abd  2x10/3-5\" Standing flex/abd  2x10/3-5\"  Standing flex/abd  2x10/3-5\"   Leg ext/flx machine   Flx 22# 3x10      Ext 22# 3x10   Flx 11# 2x10      Ext 11# 2x10 Flx 11# 2x10      Ext " "11# 2x10  Flx 22# 2x10      Ext 22# 2x10   Leg press   70#  30x   50# 2x10  60# 3x10   65# 3x10    squats             Step ups             Side steps              Hamstring stretch              Calf stretch           calf stretch with strap 3x30\"    Heel slides                            Ther Activity             STS                           Gait Training                                         Modalities                                                       "

## 2024-06-20 ENCOUNTER — OFFICE VISIT (OUTPATIENT)
Dept: PHYSICAL THERAPY | Facility: CLINIC | Age: 84
End: 2024-06-20
Payer: COMMERCIAL

## 2024-06-20 DIAGNOSIS — R29.898 LEG WEAKNESS, BILATERAL: Primary | ICD-10-CM

## 2024-06-20 PROCEDURE — 97110 THERAPEUTIC EXERCISES: CPT

## 2024-06-20 NOTE — PROGRESS NOTES
"Daily Note     Today's date: 2024  Patient name: Hazel Rodney  : 1940  MRN: [id number]  Referring provider: Budinetz, Robert, MD  Dx:   Encounter Diagnosis     ICD-10-CM    1. Leg weakness, bilateral  R29.898           Start Time: 1300  Stop Time: 1345  Total time in clinic (min): 45 minutes    Subjective: \"I am tired and sore.  This humid is not good for my body.\"      Objective: See treatment diary below      Assessment: Tolerated treatment well. Able to complete program without incident.  Quick muscle fatigue with standing exercises.  Will attempt to increase weight next session if no exacerbation occurs.  Will continue to monitor and progress as able.  Patient demonstrated fatigue post treatment and would benefit from continued PT      Plan: Continue per plan of care.  Progress treatment as tolerated.       Precautions: hx of GI bleed, low iron     Re-eval Date:  by 24     Date       Visit Count 6 7      FOTO 5/15/2024   closed      Pain In 5/10       Pain Out              Manuals                                                               Neuro Re-Ed             Tandem stance              Tandem walking              FTEO on foam              Hurdles                                                        Ther Ex             Nustep  L5  10' L4 10'      Seated marches   Standing 30x  Standing 30x       LAQ          Seated hip abd  blue 30x Blue  3x10      Seated hip add  ball  30x Ball 30x/3-5\"      SLR    Standing flex/abd  3x10/3-5 Standing flex/abd  2-3x10/3-5\"      Leg ext/flx machine   Flx 22# 3x10      Ext 22# 3x10  Flx 22# 2x10      Ext 22# 2x10      Leg press   70#  30x  70#  30x      squats             Step ups             Side steps              Hamstring stretch              Calf stretch            Heel slides                            Ther Activity             STS                           Gait Training                                         Modalities               "

## 2024-06-25 ENCOUNTER — APPOINTMENT (OUTPATIENT)
Dept: PHYSICAL THERAPY | Facility: CLINIC | Age: 84
End: 2024-06-25
Payer: COMMERCIAL

## 2024-06-27 ENCOUNTER — APPOINTMENT (OUTPATIENT)
Dept: PHYSICAL THERAPY | Facility: CLINIC | Age: 84
End: 2024-06-27
Payer: COMMERCIAL

## 2024-07-04 DIAGNOSIS — Z00.6 ENCOUNTER FOR EXAMINATION FOR NORMAL COMPARISON OR CONTROL IN CLINICAL RESEARCH PROGRAM: ICD-10-CM

## 2024-08-05 ENCOUNTER — TELEPHONE (OUTPATIENT)
Age: 84
End: 2024-08-05

## 2024-08-05 NOTE — TELEPHONE ENCOUNTER
"Patient called to advise that Wednesday is Dr Pink's birthday. She said to let him know on Wednesday that she advised office to get him \"Baconator fries from [employer]\"  "

## 2024-08-20 ENCOUNTER — DOCUMENTATION (OUTPATIENT)
Dept: FAMILY MEDICINE CLINIC | Facility: CLINIC | Age: 84
End: 2024-08-20

## 2024-08-20 DIAGNOSIS — N30.00 ACUTE CYSTITIS WITHOUT HEMATURIA: Primary | ICD-10-CM

## 2024-08-20 RX ORDER — NITROFURANTOIN 25; 75 MG/1; MG/1
100 CAPSULE ORAL 2 TIMES DAILY
Qty: 10 CAPSULE | Refills: 0 | Status: SHIPPED | OUTPATIENT
Start: 2024-08-20 | End: 2024-08-25

## 2024-09-05 DIAGNOSIS — R73.01 IMPAIRED FASTING GLUCOSE: Primary | ICD-10-CM

## 2024-09-05 DIAGNOSIS — R53.83 OTHER FATIGUE: ICD-10-CM

## 2024-09-05 DIAGNOSIS — D50.9 IRON DEFICIENCY ANEMIA, UNSPECIFIED IRON DEFICIENCY ANEMIA TYPE: ICD-10-CM

## 2024-09-06 ENCOUNTER — OFFICE VISIT (OUTPATIENT)
Dept: LAB | Facility: CLINIC | Age: 84
End: 2024-09-06
Payer: COMMERCIAL

## 2024-09-06 DIAGNOSIS — Z00.6 ENCOUNTER FOR EXAMINATION FOR NORMAL COMPARISON OR CONTROL IN CLINICAL RESEARCH PROGRAM: ICD-10-CM

## 2024-09-06 DIAGNOSIS — D50.9 IRON DEFICIENCY ANEMIA, UNSPECIFIED IRON DEFICIENCY ANEMIA TYPE: ICD-10-CM

## 2024-09-06 DIAGNOSIS — R53.83 OTHER FATIGUE: ICD-10-CM

## 2024-09-06 DIAGNOSIS — R73.01 IMPAIRED FASTING GLUCOSE: ICD-10-CM

## 2024-09-06 LAB
ALBUMIN SERPL BCG-MCNC: 3.8 G/DL (ref 3.5–5)
ALP SERPL-CCNC: 70 U/L (ref 34–104)
ALT SERPL W P-5'-P-CCNC: 34 U/L (ref 7–52)
ANION GAP SERPL CALCULATED.3IONS-SCNC: 5 MMOL/L (ref 4–13)
AST SERPL W P-5'-P-CCNC: 29 U/L (ref 13–39)
BILIRUB SERPL-MCNC: 0.4 MG/DL (ref 0.2–1)
BUN SERPL-MCNC: 17 MG/DL (ref 5–25)
CALCIUM SERPL-MCNC: 9.2 MG/DL (ref 8.4–10.2)
CHLORIDE SERPL-SCNC: 100 MMOL/L (ref 96–108)
CO2 SERPL-SCNC: 29 MMOL/L (ref 21–32)
CREAT SERPL-MCNC: 0.74 MG/DL (ref 0.6–1.3)
ERYTHROCYTE [DISTWIDTH] IN BLOOD BY AUTOMATED COUNT: 13.2 % (ref 11.6–15.1)
GFR SERPL CREATININE-BSD FRML MDRD: 75 ML/MIN/1.73SQ M
GLUCOSE P FAST SERPL-MCNC: 101 MG/DL (ref 65–99)
HCT VFR BLD AUTO: 37.4 % (ref 34.8–46.1)
HGB BLD-MCNC: 11.9 G/DL (ref 11.5–15.4)
INSULIN SERPL-ACNC: 16.02 UIU/ML (ref 1.9–23)
IRON SATN MFR SERPL: 12 % (ref 15–50)
IRON SERPL-MCNC: 53 UG/DL (ref 50–212)
MCH RBC QN AUTO: 28.8 PG (ref 26.8–34.3)
MCHC RBC AUTO-ENTMCNC: 31.8 G/DL (ref 31.4–37.4)
MCV RBC AUTO: 91 FL (ref 82–98)
PLATELET # BLD AUTO: 191 THOUSANDS/UL (ref 149–390)
PMV BLD AUTO: 9.7 FL (ref 8.9–12.7)
POTASSIUM SERPL-SCNC: 5 MMOL/L (ref 3.5–5.3)
PROT SERPL-MCNC: 5.9 G/DL (ref 6.4–8.4)
RBC # BLD AUTO: 4.13 MILLION/UL (ref 3.81–5.12)
SODIUM SERPL-SCNC: 134 MMOL/L (ref 135–147)
TIBC SERPL-MCNC: 447 UG/DL (ref 250–450)
UIBC SERPL-MCNC: 394 UG/DL (ref 155–355)
WBC # BLD AUTO: 2.8 THOUSAND/UL (ref 4.31–10.16)

## 2024-09-06 PROCEDURE — 83540 ASSAY OF IRON: CPT

## 2024-09-06 PROCEDURE — 84443 ASSAY THYROID STIM HORMONE: CPT

## 2024-09-06 PROCEDURE — 36415 COLL VENOUS BLD VENIPUNCTURE: CPT

## 2024-09-06 PROCEDURE — 85027 COMPLETE CBC AUTOMATED: CPT

## 2024-09-06 PROCEDURE — 83550 IRON BINDING TEST: CPT

## 2024-09-06 PROCEDURE — 82728 ASSAY OF FERRITIN: CPT

## 2024-09-06 PROCEDURE — 83525 ASSAY OF INSULIN: CPT

## 2024-09-06 PROCEDURE — 80053 COMPREHEN METABOLIC PANEL: CPT

## 2024-09-07 LAB
FERRITIN SERPL-MCNC: 10 NG/ML (ref 11–307)
TSH SERPL DL<=0.05 MIU/L-ACNC: 2.72 UIU/ML (ref 0.45–4.5)

## 2024-09-17 LAB
APOB+LDLR+PCSK9 GENE MUT ANL BLD/T: NOT DETECTED
BRCA1+BRCA2 DEL+DUP + FULL MUT ANL BLD/T: NOT DETECTED
MLH1+MSH2+MSH6+PMS2 GN DEL+DUP+FUL M: NOT DETECTED

## 2024-10-15 ENCOUNTER — DOCUMENTATION (OUTPATIENT)
Dept: FAMILY MEDICINE CLINIC | Facility: CLINIC | Age: 84
End: 2024-10-15

## 2024-10-15 DIAGNOSIS — D50.0 IRON DEFICIENCY ANEMIA DUE TO CHRONIC BLOOD LOSS: ICD-10-CM

## 2024-10-15 DIAGNOSIS — N30.01 HEMATURIA DUE TO ACUTE CYSTITIS: ICD-10-CM

## 2024-10-15 DIAGNOSIS — R31.9 HEMATURIA, UNSPECIFIED TYPE: Primary | ICD-10-CM

## 2024-10-17 ENCOUNTER — DOCUMENTATION (OUTPATIENT)
Dept: FAMILY MEDICINE CLINIC | Facility: CLINIC | Age: 84
End: 2024-10-17

## 2024-10-17 DIAGNOSIS — N30.00 ACUTE CYSTITIS WITHOUT HEMATURIA: Primary | ICD-10-CM

## 2024-10-17 RX ORDER — CIPROFLOXACIN 250 MG/1
250 TABLET, FILM COATED ORAL EVERY 12 HOURS SCHEDULED
Qty: 14 TABLET | Refills: 0 | Status: SHIPPED | OUTPATIENT
Start: 2024-10-17 | End: 2024-10-24

## 2024-10-21 ENCOUNTER — LAB (OUTPATIENT)
Dept: LAB | Facility: CLINIC | Age: 84
End: 2024-10-21
Payer: COMMERCIAL

## 2024-10-21 DIAGNOSIS — R31.9 HEMATURIA, UNSPECIFIED TYPE: ICD-10-CM

## 2024-10-21 DIAGNOSIS — D50.0 IRON DEFICIENCY ANEMIA DUE TO CHRONIC BLOOD LOSS: ICD-10-CM

## 2024-10-21 LAB
ALBUMIN SERPL BCG-MCNC: 3.8 G/DL (ref 3.5–5)
ALP SERPL-CCNC: 64 U/L (ref 34–104)
ALT SERPL W P-5'-P-CCNC: 11 U/L (ref 7–52)
ANION GAP SERPL CALCULATED.3IONS-SCNC: 5 MMOL/L (ref 4–13)
AST SERPL W P-5'-P-CCNC: 18 U/L (ref 13–39)
BASOPHILS # BLD AUTO: 0.07 THOUSANDS/ΜL (ref 0–0.1)
BASOPHILS NFR BLD AUTO: 1 % (ref 0–1)
BILIRUB SERPL-MCNC: 0.32 MG/DL (ref 0.2–1)
BUN SERPL-MCNC: 20 MG/DL (ref 5–25)
CALCIUM SERPL-MCNC: 9.3 MG/DL (ref 8.4–10.2)
CHLORIDE SERPL-SCNC: 99 MMOL/L (ref 96–108)
CO2 SERPL-SCNC: 30 MMOL/L (ref 21–32)
CREAT SERPL-MCNC: 0.78 MG/DL (ref 0.6–1.3)
EOSINOPHIL # BLD AUTO: 0.3 THOUSAND/ΜL (ref 0–0.61)
EOSINOPHIL NFR BLD AUTO: 6 % (ref 0–6)
ERYTHROCYTE [DISTWIDTH] IN BLOOD BY AUTOMATED COUNT: 14.7 % (ref 11.6–15.1)
FERRITIN SERPL-MCNC: 6 NG/ML (ref 11–307)
GFR SERPL CREATININE-BSD FRML MDRD: 71 ML/MIN/1.73SQ M
GLUCOSE SERPL-MCNC: 108 MG/DL (ref 65–140)
HCT VFR BLD AUTO: 31.1 % (ref 34.8–46.1)
HGB BLD-MCNC: 9.3 G/DL (ref 11.5–15.4)
IMM GRANULOCYTES # BLD AUTO: 0.02 THOUSAND/UL (ref 0–0.2)
IMM GRANULOCYTES NFR BLD AUTO: 0 % (ref 0–2)
IRON SATN MFR SERPL: 3 % (ref 15–50)
IRON SERPL-MCNC: 13 UG/DL (ref 50–212)
LYMPHOCYTES # BLD AUTO: 1.07 THOUSANDS/ΜL (ref 0.6–4.47)
LYMPHOCYTES NFR BLD AUTO: 20 % (ref 14–44)
MCH RBC QN AUTO: 24.9 PG (ref 26.8–34.3)
MCHC RBC AUTO-ENTMCNC: 29.9 G/DL (ref 31.4–37.4)
MCV RBC AUTO: 83 FL (ref 82–98)
MONOCYTES # BLD AUTO: 0.77 THOUSAND/ΜL (ref 0.17–1.22)
MONOCYTES NFR BLD AUTO: 14 % (ref 4–12)
NEUTROPHILS # BLD AUTO: 3.24 THOUSANDS/ΜL (ref 1.85–7.62)
NEUTS SEG NFR BLD AUTO: 59 % (ref 43–75)
NRBC BLD AUTO-RTO: 0 /100 WBCS
PLATELET # BLD AUTO: 243 THOUSANDS/UL (ref 149–390)
PMV BLD AUTO: 9.5 FL (ref 8.9–12.7)
POTASSIUM SERPL-SCNC: 5.2 MMOL/L (ref 3.5–5.3)
PROT SERPL-MCNC: 5.9 G/DL (ref 6.4–8.4)
RBC # BLD AUTO: 3.74 MILLION/UL (ref 3.81–5.12)
SODIUM SERPL-SCNC: 134 MMOL/L (ref 135–147)
TIBC SERPL-MCNC: 400 UG/DL (ref 250–450)
UIBC SERPL-MCNC: 387 UG/DL (ref 155–355)
WBC # BLD AUTO: 5.47 THOUSAND/UL (ref 4.31–10.16)

## 2024-10-21 PROCEDURE — 83540 ASSAY OF IRON: CPT

## 2024-10-21 PROCEDURE — 80053 COMPREHEN METABOLIC PANEL: CPT

## 2024-10-21 PROCEDURE — 83550 IRON BINDING TEST: CPT

## 2024-10-21 PROCEDURE — 36415 COLL VENOUS BLD VENIPUNCTURE: CPT

## 2024-10-21 PROCEDURE — 85025 COMPLETE CBC W/AUTO DIFF WBC: CPT

## 2024-10-21 PROCEDURE — 82728 ASSAY OF FERRITIN: CPT

## 2024-10-23 DIAGNOSIS — D50.0 IRON DEFICIENCY ANEMIA DUE TO CHRONIC BLOOD LOSS: Primary | ICD-10-CM

## 2024-10-23 RX ORDER — SODIUM CHLORIDE 9 MG/ML
20 INJECTION, SOLUTION INTRAVENOUS ONCE
OUTPATIENT
Start: 2024-10-25

## 2024-10-31 ENCOUNTER — HOSPITAL ENCOUNTER (OUTPATIENT)
Dept: INFUSION CENTER | Facility: HOSPITAL | Age: 84
End: 2024-10-31
Payer: COMMERCIAL

## 2024-10-31 VITALS
OXYGEN SATURATION: 98 % | RESPIRATION RATE: 18 BRPM | TEMPERATURE: 96.9 F | HEART RATE: 93 BPM | SYSTOLIC BLOOD PRESSURE: 135 MMHG | DIASTOLIC BLOOD PRESSURE: 83 MMHG

## 2024-10-31 DIAGNOSIS — D50.0 IRON DEFICIENCY ANEMIA DUE TO CHRONIC BLOOD LOSS: Primary | ICD-10-CM

## 2024-10-31 PROCEDURE — 96365 THER/PROPH/DIAG IV INF INIT: CPT

## 2024-10-31 RX ORDER — BUTYROSPERMUM PARKII(SHEA BUTTER), SIMMONDSIA CHINENSIS (JOJOBA) SEED OIL, ALOE BARBADENSIS LEAF EXTRACT .01; 1; 3.5 G/100G; G/100G; G/100G
3 LIQUID TOPICAL DAILY
COMMUNITY

## 2024-10-31 RX ORDER — SODIUM CHLORIDE 9 MG/ML
20 INJECTION, SOLUTION INTRAVENOUS ONCE
Status: CANCELLED | OUTPATIENT
Start: 2024-11-13

## 2024-10-31 RX ORDER — SODIUM CHLORIDE 9 MG/ML
20 INJECTION, SOLUTION INTRAVENOUS ONCE
Status: COMPLETED | OUTPATIENT
Start: 2024-10-31 | End: 2024-10-31

## 2024-10-31 RX ORDER — MAGNESIUM OXIDE/MAG AA CHELATE 300 MG
CAPSULE ORAL
COMMUNITY
End: 2024-11-14

## 2024-10-31 RX ADMIN — IRON SUCROSE 200 MG: 20 INJECTION, SOLUTION INTRAVENOUS at 13:06

## 2024-10-31 RX ADMIN — SODIUM CHLORIDE 20 ML/HR: 0.9 INJECTION, SOLUTION INTRAVENOUS at 13:03

## 2024-10-31 NOTE — PROGRESS NOTES
Patient denies current infection or being on antibiotics.  Patient tolerated IV Venofer without reaction or issues.     Hazel Rodney is aware of future appt on 11/13/24 at 1330.     AVS printed and given to Hazel Rodney:  Yes.      Patient ambulated off unit without incident.  All personal belongings taken with patient.

## 2024-11-11 ENCOUNTER — HOSPITAL ENCOUNTER (OUTPATIENT)
Dept: CT IMAGING | Facility: HOSPITAL | Age: 84
Discharge: HOME | End: 2024-11-11
Payer: COMMERCIAL

## 2024-11-11 DIAGNOSIS — R31.9 HEMATURIA, UNSPECIFIED TYPE: ICD-10-CM

## 2024-11-11 PROCEDURE — 74176 CT ABD & PELVIS W/O CONTRAST: CPT

## 2024-11-13 ENCOUNTER — HOSPITAL ENCOUNTER (OUTPATIENT)
Dept: INFUSION CENTER | Facility: HOSPITAL | Age: 84
Discharge: HOME | End: 2024-11-13
Payer: COMMERCIAL

## 2024-11-13 VITALS
HEART RATE: 82 BPM | SYSTOLIC BLOOD PRESSURE: 145 MMHG | DIASTOLIC BLOOD PRESSURE: 83 MMHG | OXYGEN SATURATION: 99 % | RESPIRATION RATE: 18 BRPM | TEMPERATURE: 98.5 F

## 2024-11-13 DIAGNOSIS — D50.0 IRON DEFICIENCY ANEMIA DUE TO CHRONIC BLOOD LOSS: Primary | ICD-10-CM

## 2024-11-13 PROCEDURE — 96365 THER/PROPH/DIAG IV INF INIT: CPT

## 2024-11-13 RX ORDER — SODIUM CHLORIDE 9 MG/ML
20 INJECTION, SOLUTION INTRAVENOUS ONCE
Status: CANCELLED | OUTPATIENT
Start: 2024-11-14

## 2024-11-13 RX ORDER — SODIUM CHLORIDE 9 MG/ML
20 INJECTION, SOLUTION INTRAVENOUS ONCE
Status: COMPLETED | OUTPATIENT
Start: 2024-11-13 | End: 2024-11-13

## 2024-11-13 RX ADMIN — IRON SUCROSE 200 MG: 20 INJECTION, SOLUTION INTRAVENOUS at 13:55

## 2024-11-13 RX ADMIN — SODIUM CHLORIDE 20 ML/HR: 9 INJECTION, SOLUTION INTRAVENOUS at 13:54

## 2024-11-13 NOTE — PROGRESS NOTES
Hazel Rodney  tolerated treatment well with no complications.      Hazel Rodney is aware that she has no further appts to be scheduled.  She is seeing her PCP tomorrow and will call the unit to schedule if he orders more.    AVS declined by Hazel Rodney.

## 2024-11-14 ENCOUNTER — OFFICE VISIT (OUTPATIENT)
Dept: FAMILY MEDICINE CLINIC | Facility: CLINIC | Age: 84
End: 2024-11-14
Payer: COMMERCIAL

## 2024-11-14 ENCOUNTER — HOSPITAL ENCOUNTER (OUTPATIENT)
Dept: INFUSION CENTER | Facility: HOSPITAL | Age: 84
End: 2024-11-14

## 2024-11-14 VITALS
DIASTOLIC BLOOD PRESSURE: 82 MMHG | OXYGEN SATURATION: 96 % | HEART RATE: 78 BPM | SYSTOLIC BLOOD PRESSURE: 128 MMHG | HEIGHT: 64 IN

## 2024-11-14 DIAGNOSIS — Z00.00 MEDICARE ANNUAL WELLNESS VISIT, SUBSEQUENT: Primary | ICD-10-CM

## 2024-11-14 PROCEDURE — G0439 PPPS, SUBSEQ VISIT: HCPCS | Performed by: FAMILY MEDICINE

## 2024-11-14 NOTE — PROGRESS NOTES
Name: Hazel Rodney      : 1940      MRN: [id number]  Encounter Provider: Robert Budinetz, MD  Encounter Date: 2024   Encounter department: [facility] PRIMARY [facility]ARE    Assessment & Plan  Medicare annual wellness visit, subsequent  Reviewed age-appropriate health maintenance and preventive care           Depression Screening and Follow-up Plan: Patient was screened for depression during today's encounter. They screened negative with a PHQ-2 score of 0.      Preventive health issues were discussed with patient, and age appropriate screening tests were ordered as noted in patient's After Visit Summary. Personalized health advice and appropriate referrals for health education or preventive services given if needed, as noted in patient's After Visit Summary.    History of Present Illness     Anupama is very tired and Sob.  This is chronic and intermittent.  She just had 2 iron infusions.  She will get a third.  She has chronic iron deficiency.  She has a chronic small bowel bleed.  She thought she has some blood in her urine her urinalysis today for ultrasound was clear her CAT scan is clear.  Will keep an eye on this clinically.  No issues with balance or falling.  No active chest pain but she is short of breath.  She understands importance of healthy diet and weight loss.       Patient Care Team:  Robert Budinetz, MD as PCP - General (Family Medicine)  Aurea Lyn MD as PCP - Endocrinology (Endocrinology)    Review of Systems   Respiratory: Negative.     Cardiovascular: Negative.      Medical History Reviewed by provider this encounter:  Tobacco  Allergies  Meds  Problems  Med Hx  Surg Hx  Fam Hx       Annual Wellness Visit Questionnaire   Hazel is here for her Subsequent Wellness visit. Last Medicare Wellness visit information reviewed, patient interviewed, no change since last AWV.     Health Risk Assessment:   Patient rates overall health as good. Patient feels that their physical health  rating is same. Patient is satisfied with their life. Eyesight was rated as same. Hearing was rated as same. Patient feels that their emotional and mental health rating is same. Patients states they are never, rarely angry. Patient states they are often unusually tired/fatigued. Pain experienced in the last 7 days has been some. Patient's pain rating has been 6/10. Patient states that she has experienced no weight loss or gain in last 6 months.     Depression Screening:   PHQ-2 Score: 0      Fall Risk Screening:   In the past year, patient has experienced: no history of falling in past year      Urinary Incontinence Screening:   Patient has leaked urine accidently in the last six months.     Home Safety:  Patient does not have trouble with stairs inside or outside of their home. Patient has working smoke alarms and has working carbon monoxide detector. Home safety hazards include: none.     Nutrition:   Current diet is Regular.     Medications:   Patient is currently taking over-the-counter supplements. OTC medications include: see medication list. Patient is able to manage medications.     Activities of Daily Living (ADLs)/Instrumental Activities of Daily Living (IADLs):   Walk and transfer into and out of bed and chair?: Yes  Dress and groom yourself?: Yes    Bathe or shower yourself?: Yes    Feed yourself? Yes  Do your laundry/housekeeping?: Yes  Manage your money, pay your bills and track your expenses?: Yes  Make your own meals?: Yes    Do your own shopping?: Yes    Previous Hospitalizations:   Any hospitalizations or ED visits within the last 12 months?: No      PREVENTIVE SCREENINGS      Cardiovascular Screening:    General: Screening Current      Diabetes Screening:     General: Screening Current      Cervical Cancer Screening:    General: Screening Not Indicated      Lung Cancer Screening:     General: Screening Not Indicated    Screening, Brief Intervention, and Referral to Treatment  "(SBIRT)    Screening  Typical number of drinks in a day: 0  Typical number of drinks in a week: 0  Interpretation: Low risk drinking behavior.    Single Item Drug Screening:  How often have you used an illegal drug (including marijuana) or a prescription medication for non-medical reasons in the past year? never    Single Item Drug Screen Score: 0  Interpretation: Negative screen for possible drug use disorder    Social Drivers of Health     Food Insecurity: No Food Insecurity (11/14/2024)    Hunger Vital Sign     Worried About Running Out of Food in the Last Year: Never true     Ran Out of Food in the Last Year: Never true   Transportation Needs: No Transportation Needs (11/14/2024)    PRAPARE - Transportation     Lack of Transportation (Medical): No     Lack of Transportation (Non-Medical): No   Housing Stability: Low Risk  (11/14/2024)    Housing Stability Vital Sign     Unable to Pay for Housing in the Last Year: No     Number of Times Moved in the Last Year: 0     Homeless in the Last Year: No   Utilities: Not At Risk (11/14/2024)    [facility] Utilities     Threatened with loss of utilities: No     No results found.    Objective   /82 (BP Location: Left arm, Patient Position: Sitting, Cuff Size: Large)   Pulse 78   Ht 5' 3.5\" (1.613 m)   SpO2 96%     Physical Exam  Vitals and nursing note reviewed.   Constitutional:       General: She is not in acute distress.     Appearance: She is well-developed.   HENT:      Head: Normocephalic and atraumatic.   Eyes:      Conjunctiva/sclera: Conjunctivae normal.   Cardiovascular:      Rate and Rhythm: Normal rate and regular rhythm.      Heart sounds: No murmur heard.  Pulmonary:      Effort: Pulmonary effort is normal. No respiratory distress.      Breath sounds: Normal breath sounds.   Abdominal:      Palpations: Abdomen is soft.      Tenderness: There is no abdominal tenderness.   Musculoskeletal:         General: No swelling.      Cervical back: Neck supple. "   Skin:     General: Skin is warm and dry.      Capillary Refill: Capillary refill takes less than 2 seconds.   Neurological:      Mental Status: She is alert.   Psychiatric:         Mood and Affect: Mood normal.

## 2024-11-14 NOTE — PATIENT INSTRUCTIONS
Medicare Preventive Visit Patient Instructions  Thank you for completing your Welcome to Medicare Visit or Medicare Annual Wellness Visit today. Your next wellness visit will be due in one year (11/15/2025).  The screening/preventive services that you may require over the next 5-10 years are detailed below. Some tests may not apply to you based off risk factors and/or age. Screening tests ordered at today's visit but not completed yet may show as past due. Also, please note that scanned in results may not display below.  Preventive Screenings:  Service Recommendations Previous Testing/Comments   Colorectal Cancer Screening  * Colonoscopy    * Fecal Occult Blood Test (FOBT)/Fecal Immunochemical Test (FIT)  * Fecal DNA/Cologuard Test  * Flexible Sigmoidoscopy Age: 45-75 years old   Colonoscopy: every 10 years (may be performed more frequently if at higher risk)  OR  FOBT/FIT: every 1 year  OR  Cologuard: every 3 years  OR  Sigmoidoscopy: every 5 years  Screening may be recommended earlier than age 45 if at higher risk for colorectal cancer. Also, an individualized decision between you and your healthcare provider will decide whether screening between the ages of 76-85 would be appropriate. Colonoscopy: Not on file  FOBT/FIT: Not on file  Cologuard: Not on file  Sigmoidoscopy: Not on file          Breast Cancer Screening Age: 40+ years old  Frequency: every 1-2 years  Not required if history of left and right mastectomy Mammogram: Not on file        Cervical Cancer Screening Between the ages of 21-29, pap smear recommended once every 3 years.   Between the ages of 30-65, can perform pap smear with HPV co-testing every 5 years.   Recommendations may differ for women with a history of total hysterectomy, cervical cancer, or abnormal pap smears in past. Pap Smear: Not on file    Screening Not Indicated   Hepatitis C Screening Once for adults born between 1945 and 1965  More frequently in patients at high risk for  Hepatitis C Hep C Antibody: Not on file        Diabetes Screening 1-2 times per year if you're at risk for diabetes or have pre-diabetes Fasting glucose: 101 mg/dL (9/6/2024)  A1C: 5.8 % (4/30/2024)  Screening Current   Cholesterol Screening Once every 5 years if you don't have a lipid disorder. May order more often based on risk factors. Lipid panel: 10/09/2023    Screening Current     Other Preventive Screenings Covered by Medicare:  Abdominal Aortic Aneurysm (AAA) Screening: covered once if your at risk. You're considered to be at risk if you have a family history of AAA.  Lung Cancer Screening: covers low dose CT scan once per year if you meet all of the following conditions: (1) Age 55-77; (2) No signs or symptoms of lung cancer; (3) Current smoker or have quit smoking within the last 15 years; (4) You have a tobacco smoking history of at least 20 pack years (packs per day multiplied by number of years you smoked); (5) You get a written order from a healthcare provider.  Glaucoma Screening: covered annually if you're considered high risk: (1) You have diabetes OR (2) Family history of glaucoma OR (3)  aged 50 and older OR (4)  American aged 65 and older  Osteoporosis Screening: covered every 2 years if you meet one of the following conditions: (1) You're estrogen deficient and at risk for osteoporosis based off medical history and other findings; (2) Have a vertebral abnormality; (3) On glucocorticoid therapy for more than 3 months; (4) Have primary hyperparathyroidism; (5) On osteoporosis medications and need to assess response to drug therapy.   Last bone density test (DXA Scan): Not on file.  HIV Screening: covered annually if you're between the age of 15-65. Also covered annually if you are younger than 15 and older than 65 with risk factors for HIV infection. For pregnant patients, it is covered up to 3 times per pregnancy.    Immunizations:  Immunization Recommendations   Influenza  Vaccine Annual influenza vaccination during flu season is recommended for all persons aged >= 6 months who do not have contraindications   Pneumococcal Vaccine   * Pneumococcal conjugate vaccine = PCV13 (Prevnar 13), PCV15 (Vaxneuvance), PCV20 (Prevnar 20)  * Pneumococcal polysaccharide vaccine = PPSV23 (Pneumovax) Adults 19-65 yo with certain risk factors or if 65+ yo  If never received any pneumonia vaccine: recommend Prevnar 20 (PCV20)  Give PCV20 if previously received 1 dose of PCV13 or PPSV23   Hepatitis B Vaccine 3 dose series if at intermediate or high risk (ex: diabetes, end stage renal disease, liver disease)   Respiratory syncytial virus (RSV) Vaccine - COVERED BY MEDICARE PART D  * RSVPreF3 (Arexvy) CDC recommends that adults 60 years of age and older may receive a single dose of RSV vaccine using shared clinical decision-making (SCDM)   Tetanus (Td) Vaccine - COST NOT COVERED BY MEDICARE PART B Following completion of primary series, a booster dose should be given every 10 years to maintain immunity against tetanus. Td may also be given as tetanus wound prophylaxis.   Tdap Vaccine - COST NOT COVERED BY MEDICARE PART B Recommended at least once for all adults. For pregnant patients, recommended with each pregnancy.   Shingles Vaccine (Shingrix) - COST NOT COVERED BY MEDICARE PART B  2 shot series recommended in those 19 years and older who have or will have weakened immune systems or those 50 years and older     Health Maintenance Due:  There are no preventive care reminders to display for this patient.  Immunizations Due:      Topic Date Due   • Pneumococcal Vaccine: 65+ Years (2 of 2 - PCV) 08/30/1991   • Influenza Vaccine (1) Never done   • COVID-19 Vaccine (1 - 2024-25 season) Never done     Advance Directives   What are advance directives?  Advance directives are legal documents that state your wishes and plans for medical care. These plans are made ahead of time in case you lose your ability to  make decisions for yourself. Advance directives can apply to any medical decision, such as the treatments you want, and if you want to donate organs.   What are the types of advance directives?  There are many types of advance directives, and each state has rules about how to use them. You may choose a combination of any of the following:  Living will:  This is a written record of the treatment you want. You can also choose which treatments you do not want, which to limit, and which to stop at a certain time. This includes surgery, medicine, IV fluid, and tube feedings.   Durable power of  for healthcare (DPAHC):  This is a written record that states who you want to make healthcare choices for you when you are unable to make them for yourself. This person, called a proxy, is usually a family member or a friend. You may choose more than 1 proxy.  Do not resuscitate (DNR) order:  A DNR order is used in case your heart stops beating or you stop breathing. It is a request not to have certain forms of treatment, such as CPR. A DNR order may be included in other types of advance directives.  Medical directive:  This covers the care that you want if you are in a coma, near death, or unable to make decisions for yourself. You can list the treatments you want for each condition. Treatment may include pain medicine, surgery, blood transfusions, dialysis, IV or tube feedings, and a ventilator (breathing machine).  Values history:  This document has questions about your views, beliefs, and how you feel and think about life. This information can help others choose the care that you would choose.  Why are advance directives important?  An advance directive helps you control your care. Although spoken wishes may be used, it is better to have your wishes written down. Spoken wishes can be misunderstood, or not followed. Treatments may be given even if you do not want them. An advance directive may make it easier for your  family to make difficult choices about your care.   Urinary Incontinence   Urinary incontinence (UI)  is when you lose control of your bladder. UI develops because your bladder cannot store or empty urine properly. The 3 most common types of UI are stress incontinence, urge incontinence, or both.  Medicines:   May be given to help strengthen your bladder control. Report any side effects of medication to your healthcare provider.  Do pelvic muscle exercises often:  Your pelvic muscles help you stop urinating. Squeeze these muscles tight for 5 seconds, then relax for 5 seconds. Gradually work up to squeezing for 10 seconds. Do 3 sets of 15 repetitions a day, or as directed. This will help strengthen your pelvic muscles and improve bladder control.  Train your bladder:  Go to the bathroom at set times, such as every 2 hours, even if you do not feel the urge to go. You can also try to hold your urine when you feel the urge to go. For example, hold your urine for 5 minutes when you feel the urge to go. As that becomes easier, hold your urine for 10 minutes.   Self-care:   Keep a UI record.  Write down how often you leak urine and how much you leak. Make a note of what you were doing when you leaked urine.  Drink liquids as directed. You may need to limit the amount of liquid you drink to help control your urine leakage. Do not drink any liquid right before you go to bed. Limit or do not have drinks that contain caffeine or alcohol.   Prevent constipation.  Eat a variety of high-fiber foods. Good examples are high-fiber cereals, beans, vegetables, and whole-grain breads. Walking is the best way to trigger your intestines to have a bowel movement.  Exercise regularly and maintain a healthy weight.  Weight loss and exercise will decrease pressure on your bladder and help you control your leakage.   Use a catheter as directed  to help empty your bladder. A catheter is a tiny, plastic tube that is put into your bladder to  drain your urine.   Go to behavior therapy as directed.  Behavior therapy may be used to help you learn to control your urge to urinate.     © Copyright [employer] 2018 Information is for End User's use only and may not be sold, redistributed or otherwise used for commercial purposes. All illustrations and images included in CareNotes® are the copyrighted property of [employer] or [employer]

## 2024-12-10 ENCOUNTER — HOSPITAL ENCOUNTER (OUTPATIENT)
Dept: INFUSION CENTER | Facility: HOSPITAL | Age: 84
Discharge: HOME | End: 2024-12-10
Payer: COMMERCIAL

## 2024-12-10 VITALS
DIASTOLIC BLOOD PRESSURE: 65 MMHG | RESPIRATION RATE: 18 BRPM | HEART RATE: 75 BPM | SYSTOLIC BLOOD PRESSURE: 138 MMHG | TEMPERATURE: 97.6 F | OXYGEN SATURATION: 98 %

## 2024-12-10 DIAGNOSIS — D50.0 IRON DEFICIENCY ANEMIA DUE TO CHRONIC BLOOD LOSS: Primary | ICD-10-CM

## 2024-12-10 PROCEDURE — 96365 THER/PROPH/DIAG IV INF INIT: CPT

## 2024-12-10 RX ORDER — SODIUM CHLORIDE 9 MG/ML
20 INJECTION, SOLUTION INTRAVENOUS ONCE
Status: CANCELLED | OUTPATIENT
Start: 2024-12-10

## 2024-12-10 RX ORDER — SODIUM CHLORIDE 9 MG/ML
20 INJECTION, SOLUTION INTRAVENOUS ONCE
Status: COMPLETED | OUTPATIENT
Start: 2024-12-10 | End: 2024-12-10

## 2024-12-10 RX ADMIN — IRON SUCROSE 200 MG: 20 INJECTION, SOLUTION INTRAVENOUS at 13:50

## 2024-12-10 RX ADMIN — SODIUM CHLORIDE 20 ML/HR: 0.9 INJECTION, SOLUTION INTRAVENOUS at 13:50

## 2024-12-10 NOTE — PROGRESS NOTES
Hazel Rodney  tolerated venofer without incident.      Hazel Rodney has no further infusions at this time     AVS printed and given to Hazel Rodney.

## 2024-12-14 PROBLEM — Z00.00 MEDICARE ANNUAL WELLNESS VISIT, SUBSEQUENT: Status: RESOLVED | Noted: 2023-11-15 | Resolved: 2024-12-14

## 2025-01-15 DIAGNOSIS — D50.9 IRON DEFICIENCY ANEMIA, UNSPECIFIED IRON DEFICIENCY ANEMIA TYPE: Primary | ICD-10-CM

## 2025-01-23 DIAGNOSIS — I10 BENIGN ESSENTIAL HTN: Primary | ICD-10-CM

## 2025-02-05 ENCOUNTER — APPOINTMENT (OUTPATIENT)
Dept: LAB | Facility: CLINIC | Age: 85
End: 2025-02-05
Payer: COMMERCIAL

## 2025-02-05 DIAGNOSIS — D50.9 IRON DEFICIENCY ANEMIA, UNSPECIFIED IRON DEFICIENCY ANEMIA TYPE: ICD-10-CM

## 2025-02-05 DIAGNOSIS — I10 BENIGN ESSENTIAL HTN: ICD-10-CM

## 2025-02-05 LAB
BASOPHILS # BLD AUTO: 0.05 THOUSANDS/ΜL (ref 0–0.1)
BASOPHILS NFR BLD AUTO: 1 % (ref 0–1)
EOSINOPHIL # BLD AUTO: 0.1 THOUSAND/ΜL (ref 0–0.61)
EOSINOPHIL NFR BLD AUTO: 2 % (ref 0–6)
ERYTHROCYTE [DISTWIDTH] IN BLOOD BY AUTOMATED COUNT: 15.6 % (ref 11.6–15.1)
FERRITIN SERPL-MCNC: 9 NG/ML (ref 11–307)
HCT VFR BLD AUTO: 42.8 % (ref 34.8–46.1)
HGB BLD-MCNC: 12.7 G/DL (ref 11.5–15.4)
IMM GRANULOCYTES # BLD AUTO: 0.02 THOUSAND/UL (ref 0–0.2)
IMM GRANULOCYTES NFR BLD AUTO: 0 % (ref 0–2)
LYMPHOCYTES # BLD AUTO: 1.34 THOUSANDS/ΜL (ref 0.6–4.47)
LYMPHOCYTES NFR BLD AUTO: 27 % (ref 14–44)
MCH RBC QN AUTO: 25.5 PG (ref 26.8–34.3)
MCHC RBC AUTO-ENTMCNC: 29.7 G/DL (ref 31.4–37.4)
MCV RBC AUTO: 86 FL (ref 82–98)
MONOCYTES # BLD AUTO: 0.7 THOUSAND/ΜL (ref 0.17–1.22)
MONOCYTES NFR BLD AUTO: 14 % (ref 4–12)
NEUTROPHILS # BLD AUTO: 2.85 THOUSANDS/ΜL (ref 1.85–7.62)
NEUTS SEG NFR BLD AUTO: 56 % (ref 43–75)
NRBC BLD AUTO-RTO: 0 /100 WBCS
PLATELET # BLD AUTO: 217 THOUSANDS/UL (ref 149–390)
PMV BLD AUTO: 10.9 FL (ref 8.9–12.7)
RBC # BLD AUTO: 4.98 MILLION/UL (ref 3.81–5.12)
WBC # BLD AUTO: 5.06 THOUSAND/UL (ref 4.31–10.16)

## 2025-02-05 PROCEDURE — 82728 ASSAY OF FERRITIN: CPT

## 2025-02-05 PROCEDURE — 83540 ASSAY OF IRON: CPT

## 2025-02-05 PROCEDURE — 83550 IRON BINDING TEST: CPT

## 2025-02-05 PROCEDURE — 80053 COMPREHEN METABOLIC PANEL: CPT

## 2025-02-05 PROCEDURE — 36415 COLL VENOUS BLD VENIPUNCTURE: CPT

## 2025-02-05 PROCEDURE — 85025 COMPLETE CBC W/AUTO DIFF WBC: CPT

## 2025-02-06 LAB
ALBUMIN SERPL BCG-MCNC: 4.2 G/DL (ref 3.5–5)
ALP SERPL-CCNC: 69 U/L (ref 34–104)
ALT SERPL W P-5'-P-CCNC: 11 U/L (ref 7–52)
ANION GAP SERPL CALCULATED.3IONS-SCNC: 9 MMOL/L (ref 4–13)
AST SERPL W P-5'-P-CCNC: 26 U/L (ref 13–39)
BILIRUB SERPL-MCNC: 0.38 MG/DL (ref 0.2–1)
BUN SERPL-MCNC: 14 MG/DL (ref 5–25)
CALCIUM SERPL-MCNC: 9.9 MG/DL (ref 8.4–10.2)
CHLORIDE SERPL-SCNC: 102 MMOL/L (ref 96–108)
CO2 SERPL-SCNC: 27 MMOL/L (ref 21–32)
CREAT SERPL-MCNC: 0.85 MG/DL (ref 0.6–1.3)
GFR SERPL CREATININE-BSD FRML MDRD: 64 ML/MIN/1.73SQ M
GLUCOSE SERPL-MCNC: 102 MG/DL (ref 65–140)
IRON SATN MFR SERPL: 8 % (ref 15–50)
IRON SERPL-MCNC: 38 UG/DL (ref 50–212)
POTASSIUM SERPL-SCNC: 5.2 MMOL/L (ref 3.5–5.3)
PROT SERPL-MCNC: 6.3 G/DL (ref 6.4–8.4)
SODIUM SERPL-SCNC: 138 MMOL/L (ref 135–147)
TIBC SERPL-MCNC: 469 UG/DL (ref 250–450)
TRANSFERRIN SERPL-MCNC: 335 MG/DL (ref 203–362)
UIBC SERPL-MCNC: 431 UG/DL (ref 155–355)

## 2025-02-26 DIAGNOSIS — R06.09 DYSPNEA ON EXERTION: Primary | ICD-10-CM

## 2025-04-17 DIAGNOSIS — R60.9 EDEMA, UNSPECIFIED TYPE: Primary | ICD-10-CM

## 2025-04-17 RX ORDER — FUROSEMIDE 20 MG/1
20 TABLET ORAL DAILY
Qty: 30 TABLET | Refills: 3 | Status: SHIPPED | OUTPATIENT
Start: 2025-04-17

## 2025-05-03 ENCOUNTER — DOCUMENTATION (OUTPATIENT)
Dept: FAMILY MEDICINE CLINIC | Facility: CLINIC | Age: 85
End: 2025-05-03

## 2025-05-03 DIAGNOSIS — E66.01 MORBID OBESITY WITH BMI OF 40.0-44.9, ADULT (HCC): Primary | ICD-10-CM

## 2025-05-03 RX ORDER — TIRZEPATIDE 2.5 MG/.5ML
2.5 INJECTION, SOLUTION SUBCUTANEOUS WEEKLY
Qty: 2 ML | Refills: 0 | Status: SHIPPED | OUTPATIENT
Start: 2025-05-03 | End: 2025-05-31

## 2025-06-12 DIAGNOSIS — D50.9 IRON DEFICIENCY ANEMIA, UNSPECIFIED IRON DEFICIENCY ANEMIA TYPE: Primary | ICD-10-CM

## 2025-06-12 DIAGNOSIS — Z13.220 LIPID SCREENING: ICD-10-CM

## 2025-06-12 DIAGNOSIS — I50.9 CONGESTIVE HEART FAILURE, UNSPECIFIED HF CHRONICITY, UNSPECIFIED HEART FAILURE TYPE (HCC): ICD-10-CM

## 2025-06-20 ENCOUNTER — LAB (OUTPATIENT)
Dept: LAB | Facility: CLINIC | Age: 85
End: 2025-06-20
Payer: COMMERCIAL

## 2025-06-20 ENCOUNTER — DOCUMENTATION (OUTPATIENT)
Dept: FAMILY MEDICINE CLINIC | Facility: CLINIC | Age: 85
End: 2025-06-20

## 2025-06-20 DIAGNOSIS — Z13.220 LIPID SCREENING: ICD-10-CM

## 2025-06-20 DIAGNOSIS — I50.9 CONGESTIVE HEART FAILURE, UNSPECIFIED HF CHRONICITY, UNSPECIFIED HEART FAILURE TYPE (HCC): ICD-10-CM

## 2025-06-20 DIAGNOSIS — R06.02 SOB (SHORTNESS OF BREATH): Primary | ICD-10-CM

## 2025-06-20 DIAGNOSIS — D50.9 IRON DEFICIENCY ANEMIA, UNSPECIFIED IRON DEFICIENCY ANEMIA TYPE: ICD-10-CM

## 2025-06-20 LAB
ALBUMIN SERPL BCG-MCNC: 4.1 G/DL (ref 3.5–5)
ALP SERPL-CCNC: 79 U/L (ref 34–104)
ALT SERPL W P-5'-P-CCNC: 11 U/L (ref 7–52)
ANION GAP SERPL CALCULATED.3IONS-SCNC: 3 MMOL/L (ref 4–13)
AST SERPL W P-5'-P-CCNC: 17 U/L (ref 13–39)
BASOPHILS # BLD AUTO: 0.07 THOUSANDS/ÂΜL (ref 0–0.1)
BASOPHILS NFR BLD AUTO: 1 % (ref 0–1)
BILIRUB SERPL-MCNC: 0.45 MG/DL (ref 0.2–1)
BNP SERPL-MCNC: 59 PG/ML (ref 0–100)
BUN SERPL-MCNC: 19 MG/DL (ref 5–25)
CALCIUM SERPL-MCNC: 9.8 MG/DL (ref 8.4–10.2)
CHLORIDE SERPL-SCNC: 100 MMOL/L (ref 96–108)
CHOLEST SERPL-MCNC: 186 MG/DL (ref ?–200)
CO2 SERPL-SCNC: 35 MMOL/L (ref 21–32)
CREAT SERPL-MCNC: 0.94 MG/DL (ref 0.6–1.3)
EOSINOPHIL # BLD AUTO: 0.05 THOUSAND/ÂΜL (ref 0–0.61)
EOSINOPHIL NFR BLD AUTO: 1 % (ref 0–6)
ERYTHROCYTE [DISTWIDTH] IN BLOOD BY AUTOMATED COUNT: 13.7 % (ref 11.6–15.1)
FERRITIN SERPL-MCNC: 9 NG/ML (ref 30–307)
GFR SERPL CREATININE-BSD FRML MDRD: 56 ML/MIN/1.73SQ M
GLUCOSE SERPL-MCNC: 115 MG/DL (ref 65–140)
HCT VFR BLD AUTO: 46.3 % (ref 34.8–46.1)
HDLC SERPL-MCNC: 77 MG/DL
HGB BLD-MCNC: 13.8 G/DL (ref 11.5–15.4)
IMM GRANULOCYTES # BLD AUTO: 0.01 THOUSAND/UL (ref 0–0.2)
IMM GRANULOCYTES NFR BLD AUTO: 0 % (ref 0–2)
IRON SATN MFR SERPL: 14 % (ref 15–50)
IRON SERPL-MCNC: 63 UG/DL (ref 50–212)
LDLC SERPL CALC-MCNC: 89 MG/DL (ref 0–100)
LYMPHOCYTES # BLD AUTO: 1.13 THOUSANDS/ÂΜL (ref 0.6–4.47)
LYMPHOCYTES NFR BLD AUTO: 21 % (ref 14–44)
MCH RBC QN AUTO: 27 PG (ref 26.8–34.3)
MCHC RBC AUTO-ENTMCNC: 29.8 G/DL (ref 31.4–37.4)
MCV RBC AUTO: 90 FL (ref 82–98)
MONOCYTES # BLD AUTO: 0.69 THOUSAND/ÂΜL (ref 0.17–1.22)
MONOCYTES NFR BLD AUTO: 13 % (ref 4–12)
NEUTROPHILS # BLD AUTO: 3.37 THOUSANDS/ÂΜL (ref 1.85–7.62)
NEUTS SEG NFR BLD AUTO: 64 % (ref 43–75)
NONHDLC SERPL-MCNC: 109 MG/DL
NRBC BLD AUTO-RTO: 0 /100 WBCS
PLATELET # BLD AUTO: 208 THOUSANDS/UL (ref 149–390)
PMV BLD AUTO: 9.9 FL (ref 8.9–12.7)
POTASSIUM SERPL-SCNC: 5.1 MMOL/L (ref 3.5–5.3)
PROT SERPL-MCNC: 6.3 G/DL (ref 6.4–8.4)
RBC # BLD AUTO: 5.12 MILLION/UL (ref 3.81–5.12)
SODIUM SERPL-SCNC: 138 MMOL/L (ref 135–147)
TIBC SERPL-MCNC: 452.2 UG/DL (ref 250–450)
TRANSFERRIN SERPL-MCNC: 323 MG/DL (ref 203–362)
TRIGL SERPL-MCNC: 102 MG/DL (ref ?–150)
UIBC SERPL-MCNC: 389 UG/DL (ref 155–355)
WBC # BLD AUTO: 5.32 THOUSAND/UL (ref 4.31–10.16)

## 2025-06-20 PROCEDURE — 85025 COMPLETE CBC W/AUTO DIFF WBC: CPT

## 2025-06-20 PROCEDURE — 83550 IRON BINDING TEST: CPT

## 2025-06-20 PROCEDURE — 83540 ASSAY OF IRON: CPT

## 2025-06-20 PROCEDURE — 36415 COLL VENOUS BLD VENIPUNCTURE: CPT

## 2025-06-20 PROCEDURE — 80053 COMPREHEN METABOLIC PANEL: CPT

## 2025-06-20 PROCEDURE — 80061 LIPID PANEL: CPT

## 2025-06-20 PROCEDURE — 82728 ASSAY OF FERRITIN: CPT

## 2025-06-20 PROCEDURE — 83880 ASSAY OF NATRIURETIC PEPTIDE: CPT

## 2025-06-24 ENCOUNTER — CONSULT (OUTPATIENT)
Dept: CARDIOLOGY CLINIC | Facility: CLINIC | Age: 85
End: 2025-06-24
Payer: COMMERCIAL

## 2025-06-24 VITALS
BODY MASS INDEX: 50.02 KG/M2 | HEIGHT: 64 IN | HEART RATE: 98 BPM | SYSTOLIC BLOOD PRESSURE: 128 MMHG | WEIGHT: 293 LBS | DIASTOLIC BLOOD PRESSURE: 88 MMHG | OXYGEN SATURATION: 95 % | TEMPERATURE: 97.4 F

## 2025-06-24 DIAGNOSIS — R06.09 DYSPNEA ON EXERTION: Primary | ICD-10-CM

## 2025-06-24 DIAGNOSIS — R60.0 LOCALIZED EDEMA: ICD-10-CM

## 2025-06-24 DIAGNOSIS — E66.813 CLASS 3 SEVERE OBESITY DUE TO EXCESS CALORIES WITHOUT SERIOUS COMORBIDITY WITH BODY MASS INDEX (BMI) OF 45.0 TO 49.9 IN ADULT: ICD-10-CM

## 2025-06-24 PROCEDURE — 93000 ELECTROCARDIOGRAM COMPLETE: CPT | Performed by: INTERNAL MEDICINE

## 2025-06-24 PROCEDURE — 99204 OFFICE O/P NEW MOD 45 MIN: CPT | Performed by: INTERNAL MEDICINE

## 2025-06-24 RX ORDER — FUROSEMIDE 20 MG/1
TABLET ORAL
Qty: 90 TABLET | Refills: 3 | Status: SHIPPED | OUTPATIENT
Start: 2025-06-24

## 2025-06-24 RX ORDER — SPIRONOLACTONE 25 MG/1
25 TABLET ORAL DAILY
Qty: 90 TABLET | Refills: 3 | Status: SHIPPED | OUTPATIENT
Start: 2025-06-24

## 2025-06-24 NOTE — PROGRESS NOTES
[facility] [place]  [address]  [address]  [facility] [id number] [phone]      Patient name: Hazel Rodney   YOB: 1940   MR no: [id number]        Diagnosis ICD-10-CM Associated Orders   1. Dyspnea on exertion  R06.09 Ambulatory Referral to Cardiology     POCT ECG     Echo complete w/ contrast if indicated     spironolactone (ALDACTONE) 25 mg tablet      2. Localized edema  R60.0 spironolactone (ALDACTONE) 25 mg tablet     furosemide (LASIX) 20 mg tablet      3. Class 3 severe obesity due to excess calories without serious comorbidity with body mass index (BMI) of 45.0 to 49.9 in adult  E66.813     Z68.42            Assessment and Recommendations:    1. Dyspnea on exertion  -     Ambulatory Referral to Cardiology  -     POCT ECG  -     Echo complete w/ contrast if indicated; Future; Expected date: 06/24/2025  -     spironolactone (ALDACTONE) 25 mg tablet; Take 1 tablet (25 mg total) by mouth daily  2. Localized edema  -     spironolactone (ALDACTONE) 25 mg tablet; Take 1 tablet (25 mg total) by mouth daily  -     furosemide (LASIX) 20 mg tablet; Take 2 furosemide tablets daily for the next 3 days and then 1 every day.  Take an additional 20 mg tablet if the weight is more than 3 pounds compared to the previous day.  3. Class 3 severe obesity due to excess calories without serious comorbidity with body mass index (BMI) of 45.0 to 49.9 in adult     Last echocardiogram from December 2023 reported normal left ventricular systolic function with normal wall thickness, only mild diastolic dysfunction with mild mitral and tricuspid regurgitation.  I would recommend an updated echocardiogram to assess her filling pressures.  Recent labs from June 2025 showed normal BNP.  So far there is no evidence that her chronic dyspnea on exertion is because of congestive diastolic heart failure.  I believe that her symptoms are primarily due to her severe obesity  and deconditioning.  However impaired LV relaxation at this age certainly could be playing a role as well.  I would recommend adding spironolactone 25 mg daily and increasing the dose of Lasix to 40 mg and adjusting the dose between 20 to 40 mg based on daily weight.  She is reluctant to take too much Lasix, but she needs the dose which is adequate enough to achieve at least 40% improvement in her lower extremity edema.  Fortunately, her kidney function is normal.  I will be in touch with the patient once have reviewed the results of her echocardiogram.  Return to cardiology in 3 months.    CHIEF COMPLAINT:  Dyspnea on exertion, lower extremity edema    HPI:  84-year-old female with past medical history significant for iron deficiency anemia and chronic dyspnea on exertion presents for first visit to [facility] cardiology.  Patient is known to have significant iron deficiency anemia for many years and currently is receiving iron infusion therapy with significant improvement in her hemoglobin.  She also tells me that she had hypertension in the past and took lisinopril but then it was stopped and her blood pressure numbers have remained normal.  Her main problem is chronic significant dyspnea on exertion and bilateral lower extremity edema.  She believes that the edema started to get worse after she had her hysterectomy.  She is extremely limited in her activities due to her severe obesity, arthritis and chronic dyspnea on exertion.  She denies any angina or syncope.    Past Medical History[1]     Past Surgical History[2]     Social History[3]    Family History[4]     Allergies   Allergen Reactions    Gluten Meal - Food Allergy GI Bleeding    Glyceryl Monolaurate Other (See Comments)    Medical Tape Other (See Comments)    Pork-Derived Products - Food Allergy Dermatitis    Swine Bile Other (See Comments)    Iodine - Food Allergy Other (See Comments)       Current Medications[5]     Lab Results   Component Value Date  "   CREATININE 0.94 06/20/2025    CREATININE 0.85 02/05/2025    CREATININE 0.78 10/21/2024    K 5.1 06/20/2025    K 5.2 02/05/2025    K 5.2 10/21/2024    SODIUM 138 06/20/2025    SODIUM 138 02/05/2025    SODIUM 134 (L) 10/21/2024    LDLCALC 89 06/20/2025    LDLCALC 107 (H) 10/09/2023    TRIG 102 06/20/2025    TRIG 95 10/09/2023    HDL 77 06/20/2025    HDL 67 10/09/2023    CHOLESTEROL 186 06/20/2025    CHOLESTEROL 193 10/09/2023       I have personally reviewed the ECG from today which showed normal sinus rhythm at 93 beats a minute with no other abnormality.      REVIEW OF SYSTEMS   Positive for: Dyspnea on exertion, lower extremity edema, fatigue, arthritis  Negative for: All remaining as reviewed below and in HPI.    SYSTEM SYMPTOMS REVIEWED:  General--weight change, fever, night sweats  Respiratory--cough, wheezing, shortness of breath, sputum production  Cardiovascular--chest pain, syncope, dyspnea on exertion, edema, decline in exercise tolerance, claudication   Gastrointestinal--persistent vomiting, diarrhea, abdominal distention, blood in stool   Muscular or skeletal--joint pain or swelling   Neurologic--headaches, syncope, abnormal movement  Hematologic--history of easy bruising and bleeding   Endocrine--thyroid enlargement, heat or cold intolerance, polyuria   Psychiatric--anxiety, depression     Vitals:    06/24/25 1519   BP: 128/88   Pulse: 98   Temp: (!) 97.4 °F (36.3 °C)   SpO2: 95%   Weight: 133 kg (294 lb)   Height: 5' 4\" (1.626 m)       Wt Readings from Last 3 Encounters:   06/24/25 133 kg (294 lb)        Body mass index is 50.46 kg/m².     General physical examination:    General appearance: Alert, no acute distress, appears stated age.  Severe obesity  HEENT: Mucous membranes are moist.  No obvious abnormality noted.  Neck: Supple with no lymphadenopathy.  No JVD.  Carotid pulses are intact.  No carotid bruit.  Cardiovascular system: Regular rhythm.  Normal S1 and S2.  No murmurs.  No rubs or " "gallops. Extremities: 2+ pitting bilateral lower extremity edema. No cyanosis.  Pulmonary: Respirations unlabored.  Good air entry bilaterally.  Clear to auscultation bilaterally.  Gastrointestinal: Abdomen is obese and nontender.  Bowel sounds are positive.  Musculoskeletal: Upper Extremities: Normal upper motor strength. Lower Extremity: Normal motor strength. Gait: Labored.  Skin: Skin is warm. No rashes or lesions.  Neurological: Patient is alert and oriented with no gross motor deficits.  Psychiatric: Mood is normal.  Behavior is normal.        Thank you for allowing me to be a part of this patient's care. If you have further questions, please feel free to contact me.    Eleno Power MD, FACC, AKIKO    Portions of the record  have been created with voice recognition software.  Occasional grammatical mistakes or wrong word or \"sound alike\" substitutions may have occurred due to the inherent limitations of voice recognition software. Please reach out to me directly for any clarifications.          [1] No past medical history on file.  [2]   Past Surgical History:  Procedure Laterality Date    APPENDECTOMY      CHOLECYSTECTOMY      HYSTERECTOMY      KNEE SURGERY     [3]   Social History  Tobacco Use    Smoking status: Former     Types: Cigarettes    Smokeless tobacco: Never   Vaping Use    Vaping status: Never Used   Substance Use Topics    Alcohol use: Not Currently    Drug use: Never   [4]   Family History  Problem Relation Name Age of Onset    Cancer Mother      Cancer Father     [5]   Current Outpatient Medications:     Cinnamon 500 MG TABS, Take 500 mg by mouth in the morning, Disp: , Rfl:     Coenzyme Q10 50 MG CAPS, Take 200 mg by mouth in the morning., Disp: , Rfl:     furosemide (LASIX) 20 mg tablet, Take 2 furosemide tablets daily for the next 3 days and then 1 every day.  Take an additional 20 mg tablet if the weight is more than 3 pounds compared to the previous day., Disp: 90 tablet, Rfl: 3    " Magnesium Citrate 100 MG CAPS, Take 3 tablets by mouth in the morning., Disp: , Rfl:     Multiple Vitamin (MULTIVITAMIN ADULT PO), Take 1 capsule by mouth in the morning., Disp: , Rfl:     Potassium Gluconate 2.5 MEQ TABS, Take 2 capsules by mouth in the morning., Disp: , Rfl:     spironolactone (ALDACTONE) 25 mg tablet, Take 1 tablet (25 mg total) by mouth daily, Disp: 90 tablet, Rfl: 3    Turmeric, Curcuma Longa, (Turmeric Root) POWD, Take 500 mg by mouth in the morning and 500 mg in the evening., Disp: , Rfl:

## 2025-08-06 ENCOUNTER — HOSPITAL ENCOUNTER (OUTPATIENT)
Dept: NON INVASIVE DIAGNOSTICS | Facility: CLINIC | Age: 85
Discharge: HOME | End: 2025-08-06
Attending: INTERNAL MEDICINE
Payer: COMMERCIAL

## 2025-08-06 VITALS
DIASTOLIC BLOOD PRESSURE: 76 MMHG | HEIGHT: 64 IN | SYSTOLIC BLOOD PRESSURE: 120 MMHG | BODY MASS INDEX: 50.02 KG/M2 | HEART RATE: 72 BPM | WEIGHT: 293 LBS

## 2025-08-06 DIAGNOSIS — R06.09 DYSPNEA ON EXERTION: ICD-10-CM

## 2025-08-06 LAB
AORTIC ROOT: 3.5 CM
AV LVOT MEAN GRADIENT: 3 MMHG
AV LVOT PEAK GRADIENT: 5 MMHG
BSA FOR ECHO PROCEDURE: 2.3 M2
DOP CALC LVOT PEAK VEL VTI: 29.59 CM
DOP CALC LVOT PEAK VEL: 1.16 M/S
E WAVE DECELERATION TIME: 202 MS
E/A RATIO: 0.78
FRACTIONAL SHORTENING: 26 (ref 28–44)
INTERVENTRICULAR SEPTUM IN DIASTOLE (PARASTERNAL SHORT AXIS VIEW): 1.2 CM
INTERVENTRICULAR SEPTUM: 1.2 CM (ref 0.6–1.1)
LAAS-AP2: 19.6 CM2
LAAS-AP4: 18 CM2
LEFT ATRIUM SIZE: 4 CM
LEFT ATRIUM VOLUME (MOD BIPLANE): 57 ML
LEFT ATRIUM VOLUME INDEX (MOD BIPLANE): 24.8 ML/M2
LEFT INTERNAL DIMENSION IN SYSTOLE: 3.5 CM (ref 2.1–4)
LEFT VENTRICULAR INTERNAL DIMENSION IN DIASTOLE: 4.7 CM (ref 3.5–6)
LEFT VENTRICULAR POSTERIOR WALL IN END DIASTOLE: 1.1 CM
LEFT VENTRICULAR STROKE VOLUME: 52 ML
LV EF US.2D.A4C+ESTIMATED: 52 %
LVSV (TEICH): 52 ML
MV E'TISSUE VEL-SEP: 10 CM/S
MV PEAK A VEL: 1.24 M/S
MV PEAK E VEL: 97 CM/S
MV STENOSIS PRESSURE HALF TIME: 59 MS
MV VALVE AREA P 1/2 METHOD: 3.73
RIGHT ATRIUM AREA SYSTOLE A4C: 17.9 CM2
RIGHT VENTRICLE ID DIMENSION: 2.8 CM
SL CV LEFT ATRIUM LENGTH A2C: 5.6 CM
SL CV LV EF: 55
SL CV PED ECHO LEFT VENTRICLE DIASTOLIC VOLUME (MOD BIPLANE) 2D: 101 ML
SL CV PED ECHO LEFT VENTRICLE SYSTOLIC VOLUME (MOD BIPLANE) 2D: 49 ML
TR MAX PG: 32 MMHG
TR PEAK VELOCITY: 2.8 M/S
TRICUSPID ANNULAR PLANE SYSTOLIC EXCURSION: 2.5 CM
TRICUSPID VALVE PEAK REGURGITATION VELOCITY: 2.83 M/S

## 2025-08-06 PROCEDURE — 93306 TTE W/DOPPLER COMPLETE: CPT

## 2025-08-06 PROCEDURE — 93306 TTE W/DOPPLER COMPLETE: CPT | Performed by: INTERNAL MEDICINE
